# Patient Record
Sex: FEMALE | Race: WHITE | NOT HISPANIC OR LATINO | Employment: OTHER | URBAN - NONMETROPOLITAN AREA
[De-identification: names, ages, dates, MRNs, and addresses within clinical notes are randomized per-mention and may not be internally consistent; named-entity substitution may affect disease eponyms.]

---

## 2017-01-06 ENCOUNTER — OFFICE VISIT (OUTPATIENT)
Dept: FAMILY MEDICINE | Facility: OTHER | Age: 62
End: 2017-01-06
Attending: PHYSICIAN ASSISTANT
Payer: COMMERCIAL

## 2017-01-06 VITALS
HEIGHT: 65 IN | HEART RATE: 70 BPM | DIASTOLIC BLOOD PRESSURE: 76 MMHG | OXYGEN SATURATION: 97 % | BODY MASS INDEX: 30.99 KG/M2 | TEMPERATURE: 97.9 F | RESPIRATION RATE: 16 BRPM | WEIGHT: 186 LBS | SYSTOLIC BLOOD PRESSURE: 122 MMHG

## 2017-01-06 DIAGNOSIS — J43.8 OTHER EMPHYSEMA (H): Primary | ICD-10-CM

## 2017-01-06 PROCEDURE — 99213 OFFICE O/P EST LOW 20 MIN: CPT | Performed by: PHYSICIAN ASSISTANT

## 2017-01-06 ASSESSMENT — ANXIETY QUESTIONNAIRES
2. NOT BEING ABLE TO STOP OR CONTROL WORRYING: SEVERAL DAYS
7. FEELING AFRAID AS IF SOMETHING AWFUL MIGHT HAPPEN: SEVERAL DAYS
6. BECOMING EASILY ANNOYED OR IRRITABLE: NOT AT ALL
IF YOU CHECKED OFF ANY PROBLEMS ON THIS QUESTIONNAIRE, HOW DIFFICULT HAVE THESE PROBLEMS MADE IT FOR YOU TO DO YOUR WORK, TAKE CARE OF THINGS AT HOME, OR GET ALONG WITH OTHER PEOPLE: NOT DIFFICULT AT ALL
GAD7 TOTAL SCORE: 5
3. WORRYING TOO MUCH ABOUT DIFFERENT THINGS: SEVERAL DAYS
1. FEELING NERVOUS, ANXIOUS, OR ON EDGE: SEVERAL DAYS
5. BEING SO RESTLESS THAT IT IS HARD TO SIT STILL: NOT AT ALL

## 2017-01-06 ASSESSMENT — PAIN SCALES - GENERAL: PAINLEVEL: NO PAIN (0)

## 2017-01-06 ASSESSMENT — PATIENT HEALTH QUESTIONNAIRE - PHQ9: 5. POOR APPETITE OR OVEREATING: SEVERAL DAYS

## 2017-01-06 NOTE — PROGRESS NOTES
SUBJECTIVE:                                                    Latrice Henao is a 61 year old female who presents to clinic today for the following health issues:      COPD Follow-Up    Symptoms are currently: slightly worsened as it normally is in winter    Current fatigue or dyspnea with ambulation: none, does with stairs    Shortness of breath: slightly worsened due to cold air    Increased or change in Cough/Sputum: No    Fever(s): No    Baseline ambulation without stopping to rest only has issue if walks fast. . Able to walk up 12 stairs without stopping to rest.    Any ER/UC or hospital admissions since your last visit? No     History   Smoking status     Former Smoker -- 0.50 packs/day for 40 years     Types: Cigarettes     Quit date: 12/28/2012   Smokeless tobacco     Not on file     Comment: NextGen: current every day smoker - passive smoke exposure     No results found for this basename: FEV1, VTC6KSM       Amount of exercise or physical activity: 6-7 days/week for an average of 30-45 minutes    Problems taking medications regularly: No    Medication side effects: none    Diet: regular (no restrictions)        Problem list and histories reviewed & adjusted, as indicated.  Additional history: as documented    Patient Active Problem List   Diagnosis     COPD (chronic obstructive pulmonary disease) (H)     Hypertension     ACP (advance care planning)     Past Surgical History   Procedure Laterality Date     Blepharoplasty, brow lift bilateral, combined Bilateral 6/2/2016     Procedure: COMBINED BLEPHAROPLASTY, BROW LIFT BILATERAL;  Surgeon: David Vitale MD;  Location: [facility]       Social History   Substance Use Topics     Smoking status: Former Smoker -- 0.50 packs/day for 40 years     Types: Cigarettes     Quit date: 12/28/2012     Smokeless tobacco: Not on file      Comment: NextGen: current every day smoker - passive smoke exposure     Alcohol Use: No     Family History   Problem Relation  "Age of Onset     CANCER Father      lymphoma     Myocardial Infarction Paternal Grandmother      myocardial infarction - cause of death         Current Outpatient Prescriptions   Medication Sig Dispense Refill     albuterol (ALBUTEROL) 108 (90 BASE) MCG/ACT inhaler Inhale 2 puffs into the lungs every 6 hours as needed 3 Inhaler 4     budesonide-formoterol (SYMBICORT) 160-4.5 MCG/ACT inhaler Inhale 2 puffs into the lungs 2 times daily 3 Inhaler 3     escitalopram (LEXAPRO) 20 MG tablet Take 1 tablet (20 mg) by mouth daily 90 tablet 3     loratadine 10 MG capsule Take 10 mg by mouth daily 30 capsule 11     LORazepam (ATIVAN) 0.5 MG tablet Take 1 tablet (0.5 mg) by mouth every 8 hours as needed for anxiety 10 tablet 0     No Known Allergies  BP Readings from Last 3 Encounters:   01/06/17 122/76   06/24/16 132/78   06/02/16 158/86    Wt Readings from Last 3 Encounters:   01/06/17 186 lb (84.369 kg)   06/24/16 178 lb (80.74 kg)   05/20/16 174 lb (78.926 kg)                  Problem list, Medication list, Allergies, and Medical/Social/Surgical histories reviewed in [facility] and updated as appropriate.    ROS:  Constitutional, neuro, ENT, endocrine, pulmonary, cardiac, gastrointestinal, genitourinary, musculoskeletal, integument and psychiatric systems are negative, except as otherwise noted.    OBJECTIVE:                                                    /76 mmHg  Pulse 70  Temp(Src) 97.9  F (36.6  C) (Tympanic)  Resp 16  Ht 5' 4.5\" (1.638 m)  Wt 186 lb (84.369 kg)  BMI 31.45 kg/m2  SpO2 97%  Body mass index is 31.45 kg/(m^2).  GENERAL APPEARANCE: healthy, alert and no distress  EYES: Eyes grossly normal to inspection, PERRL and conjunctivae and sclerae normal  HENT: ear canals and TM's normal and nose and mouth without ulcers or lesions  NECK: no adenopathy, no asymmetry, masses, or scars and thyroid normal to palpation  RESP: lungs clear to auscultation - no rales, rhonchi or wheezes  CV: regular rates and " rhythm, normal S1 S2, no S3 or S4 and no murmur, click or rub  LYMPHATICS: normal ant/post cervical and supraclavicular nodes  ABDOMEN: soft, nontender, without hepatosplenomegaly or masses and bowel sounds normal  MS: extremities normal- no gross deformities noted  SKIN: no suspicious lesions or rashes  NEURO: Normal strength and tone, mentation intact and speech normal  PSYCH: mentation appears normal and affect stressed. Mom is ill and going to ER as she is in her visit today.     Diagnostic test results:  Diagnostic Test Results:  none      ASSESSMENT/PLAN:                                                    1. Other emphysema (H)  She is in need of PFT never did have this.   Cold is a trigger. Using her inhaler. Not finding much relief today.    See us back once done.     - COPD ACTION PLAN - order for Health Maintenance      See Patient Instructions    JUSTIN Lira  [facility] JULI

## 2017-01-06 NOTE — MR AVS SNAPSHOT
After Visit Summary   1/6/2017    Latrice Henao    MRN: [id number]           Patient Information     Date Of Birth          1955        Visit Information        Provider Department      1/6/2017 8:45 AM Elsa Medrano, PA [facility]        Today's Diagnoses     Other emphysema (H)    -  1       Care Instructions      My COPD Action Plan   Name: Latrice Henao    YOB: 1955    Date: 1/6/2017    My doctor: Elsa Medrano    My clinic: [facility]    [address]  [place] [address]  [phone]   My COPD Medicine:       My Controller Medications: Formoterol/Budesonide (Symbicort)     My Rescue Medication:  Albuterol     Use of Oxygen:  Oxygen Not Prescribed   My COPD Severity: Spirometry never done.        GREEN ZONE       Doing well today      Usual level of activity and exercise    Usual amount of cough and mucus    No shortness of breath    Can think clearly    Sleep well at night    Feel like eating Actions:      Take daily medicines    Use oxygen as prescribed    Follow regular exercise and diet plan    Avoid cigarette smoke and other irritants that harm the lungs             YELLOW ZONE          Having a bad day or flare up      Short of breath more than usual    Change in color or amount of mucus    More coughing or wheezing    Fever or chills    Less energy; trouble completing activities    Trouble thinking or focusing    Using quick relief inhaler or nebulizer more often    Poor sleep; symptoms wake me up    Do not feel like eating Actions:      Take daily medicines    Use quick relief inhaler every 4 hours    If you use oxygen, call you doctor to see if you should adjust your oxygen    Do breathing exercises or other things to help you relax    Let a loved one, friend or neighbor know you are feeling worse    If you have one or more symptoms, consider taking steroids or antibiotics (if they were prescribed)    Call your care  team if you have 2 or more symptoms or symptoms don't improve           RED ZONE       Need medical care now      Severe shortness of breath (feel you can't breath)    Not enough breath to do any activity    Trouble walking or talking    Trouble coughing up mucus or blood in mucus    Frequent coughing    Rescue medicines are not working    Not able to sleep because of breathing    Feel confused or drowsy    Chest pain  Actions:      Call 911 or     Have someone take you to the emergency room if you can't reach your care team        Electronically signed by: Deann Soto, January 6, 2017     Annual Reminders:  Meet with Care Team, Flu Shot every Fall and Pneumonia Shot at least once.    [facility] [place], [place] - [address] [facility]         Follow-ups after your visit        Who to contact     If you have questions or need follow up information about today's clinic visit or your schedule please contact [facility] directly at [phone].  Normal or non-critical lab and imaging results will be communicated to you by Acid Labshart, letter or phone within 4 business days after the clinic has received the results. If you do not hear from us within 7 days, please contact the clinic through OxiCoolt or phone. If you have a critical or abnormal lab result, we will notify you by phone as soon as possible.  Submit refill requests through Ayannah or call your pharmacy and they will forward the refill request to us. Please allow 3 business days for your refill to be completed.          Additional Information About Your Visit        Acid Labshart Information     Ayannah gives you secure access to your electronic health record. If you see a primary care provider, you can also send messages to your care team and make appointments. If you have questions, please call your primary care clinic.  If you do not have a primary care provider, please call [phone] and they will assist you.        Care EveryWhere ID     This is  "your Care EveryWhere ID. This could be used by other organizations to access your [facility] medical records  [id number]        Your Vitals Were     Pulse Temperature Respirations Height BMI (Body Mass Index) Pulse Oximetry    70 97.9  F (36.6  C) (Tympanic) 16 5' 4.5\" (1.638 m) 31.45 kg/m2 97%       Blood Pressure from Last 3 Encounters:   01/06/17 122/76   06/24/16 132/78   06/02/16 158/86    Weight from Last 3 Encounters:   01/06/17 186 lb (84.369 kg)   06/24/16 178 lb (80.74 kg)   05/20/16 174 lb (78.926 kg)              We Performed the Following     COPD ACTION PLAN - order for Health Maintenance        Primary Care Provider Office Phone # Fax #    JUSTIN Bansal [phone]       [facility] [address]  [facility] [place] [address]        Thank you!     Thank you for choosing [facility]  for your care. Our goal is always to provide you with excellent care. Hearing back from our patients is one way we can continue to improve our services. Please take a few minutes to complete the written survey that you may receive in the mail after your visit with us. Thank you!             Your Updated Medication List - Protect others around you: Learn how to safely use, store and throw away your medicines at www.disposemymeds.org.          This list is accurate as of: 1/6/17  9:37 AM.  Always use your most recent med list.                   Brand Name Dispense Instructions for use    albuterol 108 (90 BASE) MCG/ACT Inhaler    albuterol    3 Inhaler    Inhale 2 puffs into the lungs every 6 hours as needed       budesonide-formoterol 160-4.5 MCG/ACT Inhaler    SYMBICORT    3 Inhaler    Inhale 2 puffs into the lungs 2 times daily       escitalopram 20 MG tablet    LEXAPRO    90 tablet    Take 1 tablet (20 mg) by mouth daily       loratadine 10 MG capsule     30 capsule    Take 10 mg by mouth daily       LORazepam 0.5 MG tablet    ATIVAN    10 tablet    Take 1 tablet (0.5 mg) by " mouth every 8 hours as needed for anxiety

## 2017-01-06 NOTE — NURSING NOTE
"Chief Complaint   Patient presents with     COPD       Initial /76 mmHg  Pulse 70  Temp(Src) 97.9  F (36.6  C) (Tympanic)  Resp 16  Ht 5' 4.5\" (1.638 m)  Wt 186 lb (84.369 kg)  BMI 31.45 kg/m2  SpO2 97% Estimated body mass index is 31.45 kg/(m^2) as calculated from the following:    Height as of this encounter: 5' 4.5\" (1.638 m).    Weight as of this encounter: 186 lb (84.369 kg).  BP completed using cuff size: swapna Soto LPN      "

## 2017-01-06 NOTE — PATIENT INSTRUCTIONS
My COPD Action Plan   Name: Latrice Henao    YOB: 1955    Date: 1/6/2017    My doctor: Elsa Medrano    My clinic: [facility] [address]  [place] [address]  [phone]   My COPD Medicine:       My Controller Medications: Formoterol/Budesonide (Symbicort)     My Rescue Medication:  Albuterol     Use of Oxygen:  Oxygen Not Prescribed   My COPD Severity: Spirometry never done.        GREEN ZONE       Doing well today      Usual level of activity and exercise    Usual amount of cough and mucus    No shortness of breath    Can think clearly    Sleep well at night    Feel like eating Actions:      Take daily medicines    Use oxygen as prescribed    Follow regular exercise and diet plan    Avoid cigarette smoke and other irritants that harm the lungs             YELLOW ZONE          Having a bad day or flare up      Short of breath more than usual    Change in color or amount of mucus    More coughing or wheezing    Fever or chills    Less energy; trouble completing activities    Trouble thinking or focusing    Using quick relief inhaler or nebulizer more often    Poor sleep; symptoms wake me up    Do not feel like eating Actions:      Take daily medicines    Use quick relief inhaler every 4 hours    If you use oxygen, call you doctor to see if you should adjust your oxygen    Do breathing exercises or other things to help you relax    Let a loved one, friend or neighbor know you are feeling worse    If you have one or more symptoms, consider taking steroids or antibiotics (if they were prescribed)    Call your care team if you have 2 or more symptoms or symptoms don't improve           RED ZONE       Need medical care now      Severe shortness of breath (feel you can't breath)    Not enough breath to do any activity    Trouble walking or talking    Trouble coughing up mucus or blood in mucus    Frequent coughing    Rescue medicines are not working    Not able to sleep  because of breathing    Feel confused or drowsy    Chest pain  Actions:      Call 911 or     Have someone take you to the emergency room if you can't reach your care team        Electronically signed by: Deann Soto, January 6, 2017     Annual Reminders:  Meet with Care Team, Flu Shot every Fall and Pneumonia Shot at least once.    [facility] - [place], [place] - [address] [facility]

## 2017-01-07 ASSESSMENT — ANXIETY QUESTIONNAIRES: GAD7 TOTAL SCORE: 5

## 2017-01-07 ASSESSMENT — PATIENT HEALTH QUESTIONNAIRE - PHQ9: SUM OF ALL RESPONSES TO PHQ QUESTIONS 1-9: 3

## 2017-01-30 ENCOUNTER — MYC MEDICAL ADVICE (OUTPATIENT)
Dept: FAMILY MEDICINE | Facility: OTHER | Age: 62
End: 2017-01-30

## 2017-01-30 DIAGNOSIS — J43.8 OTHER EMPHYSEMA (H): Primary | ICD-10-CM

## 2017-01-30 NOTE — TELEPHONE ENCOUNTER
Patient would like to see Dr. Grant after she has her PFT's done. [facility] so I did fix this.  Please advise if okay for referral.  Deann Soto LPN

## 2017-02-07 ENCOUNTER — HOSPITAL ENCOUNTER (OUTPATIENT)
Dept: RESPIRATORY THERAPY | Facility: HOSPITAL | Age: 62
Discharge: HOME | End: 2017-02-07
Attending: PHYSICIAN ASSISTANT | Admitting: PHYSICIAN ASSISTANT
Payer: COMMERCIAL

## 2017-02-07 DIAGNOSIS — J43.8 OTHER EMPHYSEMA (H): ICD-10-CM

## 2017-02-07 LAB
COHGB MFR BLD: 1.7 % (ref 0–2)
HGB BLD-MCNC: 13.4 G/DL (ref 11.7–15.7)

## 2017-02-07 PROCEDURE — 94726 PLETHYSMOGRAPHY LUNG VOLUMES: CPT | Mod: 26 | Performed by: INTERNAL MEDICINE

## 2017-02-07 PROCEDURE — 94726 PLETHYSMOGRAPHY LUNG VOLUMES: CPT

## 2017-02-07 PROCEDURE — 94729 DIFFUSING CAPACITY: CPT

## 2017-02-07 PROCEDURE — 25000308 HC RX OP HPI UCR WEL MED 250 IP 250: Performed by: PHYSICIAN ASSISTANT

## 2017-02-07 PROCEDURE — 82375 ASSAY CARBOXYHB QUANT: CPT | Performed by: PHYSICIAN ASSISTANT

## 2017-02-07 PROCEDURE — 85018 HEMOGLOBIN: CPT | Performed by: PHYSICIAN ASSISTANT

## 2017-02-07 PROCEDURE — 36415 COLL VENOUS BLD VENIPUNCTURE: CPT | Performed by: PHYSICIAN ASSISTANT

## 2017-02-07 PROCEDURE — 94729 DIFFUSING CAPACITY: CPT | Mod: 26 | Performed by: INTERNAL MEDICINE

## 2017-02-07 PROCEDURE — 94060 EVALUATION OF WHEEZING: CPT | Mod: 26 | Performed by: INTERNAL MEDICINE

## 2017-02-07 PROCEDURE — 94060 EVALUATION OF WHEEZING: CPT

## 2017-02-07 RX ORDER — ALBUTEROL SULFATE 0.83 MG/ML
2.5 SOLUTION RESPIRATORY (INHALATION) ONCE
Status: COMPLETED | OUTPATIENT
Start: 2017-02-07 | End: 2017-02-07

## 2017-02-07 RX ADMIN — ALBUTEROL SULFATE 2.5 MG: 2.5 SOLUTION RESPIRATORY (INHALATION) at 08:03

## 2017-03-20 ENCOUNTER — OFFICE VISIT (OUTPATIENT)
Dept: FAMILY MEDICINE | Facility: OTHER | Age: 62
End: 2017-03-20
Attending: PHYSICIAN ASSISTANT
Payer: COMMERCIAL

## 2017-03-20 VITALS
HEART RATE: 72 BPM | WEIGHT: 184 LBS | BODY MASS INDEX: 30.66 KG/M2 | TEMPERATURE: 98.3 F | SYSTOLIC BLOOD PRESSURE: 142 MMHG | DIASTOLIC BLOOD PRESSURE: 82 MMHG | HEIGHT: 65 IN | OXYGEN SATURATION: 93 %

## 2017-03-20 DIAGNOSIS — I10 ESSENTIAL HYPERTENSION: ICD-10-CM

## 2017-03-20 DIAGNOSIS — E78.00 ELEVATED CHOLESTEROL: ICD-10-CM

## 2017-03-20 DIAGNOSIS — J43.1 PANLOBULAR EMPHYSEMA (H): Primary | ICD-10-CM

## 2017-03-20 DIAGNOSIS — I51.89 DIASTOLIC DYSFUNCTION: ICD-10-CM

## 2017-03-20 DIAGNOSIS — Z23 IMMUNIZATION DUE: ICD-10-CM

## 2017-03-20 LAB
ALBUMIN SERPL-MCNC: 3.9 G/DL (ref 3.4–5)
ALP SERPL-CCNC: 57 U/L (ref 40–150)
ALT SERPL W P-5'-P-CCNC: 27 U/L (ref 0–50)
ANION GAP SERPL CALCULATED.3IONS-SCNC: 6 MMOL/L (ref 3–14)
AST SERPL W P-5'-P-CCNC: 14 U/L (ref 0–45)
BASOPHILS # BLD AUTO: 0.1 10E9/L (ref 0–0.2)
BASOPHILS NFR BLD AUTO: 1.8 %
BILIRUB SERPL-MCNC: 0.5 MG/DL (ref 0.2–1.3)
BUN SERPL-MCNC: 13 MG/DL (ref 7–30)
CALCIUM SERPL-MCNC: 8.5 MG/DL (ref 8.5–10.1)
CHLORIDE SERPL-SCNC: 107 MMOL/L (ref 94–109)
CHOLEST SERPL-MCNC: 166 MG/DL
CO2 SERPL-SCNC: 28 MMOL/L (ref 20–32)
CREAT SERPL-MCNC: 0.71 MG/DL (ref 0.52–1.04)
DIFFERENTIAL METHOD BLD: NORMAL
EOSINOPHIL # BLD AUTO: 0.2 10E9/L (ref 0–0.7)
EOSINOPHIL NFR BLD AUTO: 4 %
ERYTHROCYTE [DISTWIDTH] IN BLOOD BY AUTOMATED COUNT: 12.8 % (ref 10–15)
GFR SERPL CREATININE-BSD FRML MDRD: 83 ML/MIN/1.7M2
GLUCOSE SERPL-MCNC: 93 MG/DL (ref 70–99)
HCT VFR BLD AUTO: 40.6 % (ref 35–47)
HDLC SERPL-MCNC: 76 MG/DL
HGB BLD-MCNC: 13.2 G/DL (ref 11.7–15.7)
IMM GRANULOCYTES # BLD: 0 10E9/L (ref 0–0.4)
IMM GRANULOCYTES NFR BLD: 0.2 %
LDLC SERPL CALC-MCNC: 79 MG/DL
LYMPHOCYTES # BLD AUTO: 1.5 10E9/L (ref 0.8–5.3)
LYMPHOCYTES NFR BLD AUTO: 33.1 %
MCH RBC QN AUTO: 28.9 PG (ref 26.5–33)
MCHC RBC AUTO-ENTMCNC: 32.5 G/DL (ref 31.5–36.5)
MCV RBC AUTO: 89 FL (ref 78–100)
MONOCYTES # BLD AUTO: 0.3 10E9/L (ref 0–1.3)
MONOCYTES NFR BLD AUTO: 6.7 %
NEUTROPHILS # BLD AUTO: 2.4 10E9/L (ref 1.6–8.3)
NEUTROPHILS NFR BLD AUTO: 54.2 %
NONHDLC SERPL-MCNC: 90 MG/DL
NRBC # BLD AUTO: 0 10*3/UL
NRBC BLD AUTO-RTO: 0 /100
PLATELET # BLD AUTO: 260 10E9/L (ref 150–450)
POTASSIUM SERPL-SCNC: 4.2 MMOL/L (ref 3.4–5.3)
PROT SERPL-MCNC: 7.5 G/DL (ref 6.8–8.8)
RBC # BLD AUTO: 4.57 10E12/L (ref 3.8–5.2)
SODIUM SERPL-SCNC: 141 MMOL/L (ref 133–144)
TRIGL SERPL-MCNC: 55 MG/DL
TSH SERPL DL<=0.005 MIU/L-ACNC: 0.58 MU/L (ref 0.4–4)
WBC # BLD AUTO: 4.5 10E9/L (ref 4–11)

## 2017-03-20 PROCEDURE — 80050 GENERAL HEALTH PANEL: CPT | Performed by: PHYSICIAN ASSISTANT

## 2017-03-20 PROCEDURE — 90471 IMMUNIZATION ADMIN: CPT | Performed by: PHYSICIAN ASSISTANT

## 2017-03-20 PROCEDURE — 80061 LIPID PANEL: CPT | Performed by: PHYSICIAN ASSISTANT

## 2017-03-20 PROCEDURE — 36415 COLL VENOUS BLD VENIPUNCTURE: CPT | Performed by: PHYSICIAN ASSISTANT

## 2017-03-20 PROCEDURE — 99214 OFFICE O/P EST MOD 30 MIN: CPT | Mod: 25 | Performed by: PHYSICIAN ASSISTANT

## 2017-03-20 PROCEDURE — 90732 PPSV23 VACC 2 YRS+ SUBQ/IM: CPT | Performed by: PHYSICIAN ASSISTANT

## 2017-03-20 RX ORDER — ALBUTEROL SULFATE 90 UG/1
2 AEROSOL, METERED RESPIRATORY (INHALATION) EVERY 6 HOURS PRN
Qty: 3 INHALER | Refills: 4 | Status: SHIPPED | OUTPATIENT
Start: 2017-03-20 | End: 2017-06-14

## 2017-03-20 RX ORDER — TIOTROPIUM BROMIDE 18 UG/1
CAPSULE ORAL; RESPIRATORY (INHALATION)
Qty: 30 CAPSULE | Refills: 1 | Status: SHIPPED | OUTPATIENT
Start: 2017-03-20 | End: 2017-05-18

## 2017-03-20 ASSESSMENT — PAIN SCALES - GENERAL: PAINLEVEL: NO PAIN (0)

## 2017-03-20 NOTE — PATIENT INSTRUCTIONS
Thank you for choosing [facility].   I appreciate the opportunity to serve you and look forward to supporting your healthcare needs in the future. Please contact me with any questions or concerns that you may have.    Sincerely,    Elsa Medrano RN, PA-C

## 2017-03-20 NOTE — MR AVS SNAPSHOT
After Visit Summary   3/20/2017    Latrice Henao    MRN: [id number]           Patient Information     Date Of Birth          1955        Visit Information        Provider Department      3/20/2017 10:30 AM Elsa Medrano PA [facility]        Today's Diagnoses     Panlobular emphysema (H)    -  1    Essential hypertension        Elevated cholesterol          Care Instructions    Thank you for choosing [facility].   I appreciate the opportunity to serve you and look forward to supporting your healthcare needs in the future. Please contact me with any questions or concerns that you may have.    Sincerely,    Elsa Medrano RN, PA-C           Follow-ups after your visit        Who to contact     If you have questions or need follow up information about today's clinic visit or your schedule please contact [facility] directly at [phone].  Normal or non-critical lab and imaging results will be communicated to you by MyChart, letter or phone within 4 business days after the clinic has received the results. If you do not hear from us within 7 days, please contact the clinic through MyChart or phone. If you have a critical or abnormal lab result, we will notify you by phone as soon as possible.  Submit refill requests through Buzz All Stars or call your pharmacy and they will forward the refill request to us. Please allow 3 business days for your refill to be completed.          Additional Information About Your Visit        MyChart Information     Buzz All Stars gives you secure access to your electronic health record. If you see a primary care provider, you can also send messages to your care team and make appointments. If you have questions, please call your primary care clinic.  If you do not have a primary care provider, please call [phone] and they will assist you.        Care EveryWhere ID     This is your Care EveryWhere ID. This could be used by other  "organizations to access your [facility] medical records  [id number]        Your Vitals Were     Pulse Temperature Height Pulse Oximetry BMI (Body Mass Index)       72 98.3  F (36.8  C) (Tympanic) 5' 4.5\" (1.638 m) 93% 31.1 kg/m2        Blood Pressure from Last 3 Encounters:   03/20/17 142/82   01/06/17 122/76   06/24/16 132/78    Weight from Last 3 Encounters:   03/20/17 184 lb (83.5 kg)   01/06/17 186 lb (84.4 kg)   06/24/16 178 lb (80.7 kg)              We Performed the Following     CBC with platelets differential     Comprehensive metabolic panel     CT Chest w Contrast     Echocardiogram     Lipid Profile     TSH with free T4 reflex          Today's Medication Changes          These changes are accurate as of: 3/20/17 12:04 PM.  If you have any questions, ask your nurse or doctor.               Start taking these medicines.        Dose/Directions    fluticasone-vilanterol 200-25 MCG/INH oral inhaler   Commonly known as:  BREO ELLIPTA   Used for:  Panlobular emphysema (H)   Started by:  Elsa Medrano PA        Dose:  1 puff   Inhale 1 puff into the lungs daily   Quantity:  1 Inhaler   Refills:  1       tiotropium 18 MCG capsule   Commonly known as:  SPIRIVA HANDIHALER   Used for:  Panlobular emphysema (H)   Started by:  Elsa Mderano PA        Inhale contents of one capsule daily.   Quantity:  30 capsule   Refills:  1         Stop taking these medicines if you haven't already. Please contact your care team if you have questions.     budesonide-formoterol 160-4.5 MCG/ACT Inhaler   Commonly known as:  SYMBICORT   Stopped by:  Elsa Medrano PA                Where to get your medicines      These medications were sent to [facility] - [place], [place] - [address] [address]     Phone:  [phone]     albuterol 108 (90 BASE) MCG/ACT Inhaler    fluticasone-vilanterol 200-25 MCG/INH oral inhaler    tiotropium 18 MCG capsule                Primary Care Provider Office " Phone # Fax #    JUSTIN Bansal [phone]       [facility] [address] [place] [address]        Thank you!     Thank you for choosing [facility]  for your care. Our goal is always to provide you with excellent care. Hearing back from our patients is one way we can continue to improve our services. Please take a few minutes to complete the written survey that you may receive in the mail after your visit with us. Thank you!             Your Updated Medication List - Protect others around you: Learn how to safely use, store and throw away your medicines at www.disposemymeds.org.          This list is accurate as of: 3/20/17 12:04 PM.  Always use your most recent med list.                   Brand Name Dispense Instructions for use    albuterol 108 (90 BASE) MCG/ACT Inhaler    albuterol    3 Inhaler    Inhale 2 puffs into the lungs every 6 hours as needed       escitalopram 20 MG tablet    LEXAPRO    90 tablet    Take 1 tablet (20 mg) by mouth daily       fluticasone-vilanterol 200-25 MCG/INH oral inhaler    BREO ELLIPTA    1 Inhaler    Inhale 1 puff into the lungs daily       loratadine 10 MG capsule     30 capsule    Take 10 mg by mouth daily       LORazepam 0.5 MG tablet    ATIVAN    10 tablet    Take 1 tablet (0.5 mg) by mouth every 8 hours as needed for anxiety       tiotropium 18 MCG capsule    SPIRIVA HANDIHALER    30 capsule    Inhale contents of one capsule daily.

## 2017-03-20 NOTE — PROGRESS NOTES
SUBJECTIVE:                                                    Latrice Henao is a 62 year old female who presents to clinic today for the following health issues:      Breathing Problem      Duration: Follow up     Description (location/character/radiation): PFT Done, SOB    Intensity:  moderate, severe    Accompanying signs and symptoms: none    History (similar episodes/previous evaluation): yes    Precipitating or alleviating factors: None    Therapies tried and outcome: PFT Done. Inhalers not working        Hyperlipidemia Follow-Up      Rate your low fat/cholesterol diet?: good    Taking statin?  No    Other lipid medications/supplements?:  none     Hypertension Follow-up      Outpatient blood pressures are not being checked.    Low Salt Diet: no added salt       Problem list and histories reviewed & adjusted, as indicated.  Additional history: as documented    Patient Active Problem List   Diagnosis     COPD (chronic obstructive pulmonary disease) (H)     Hypertension     ACP (advance care planning)     Past Surgical History   Procedure Laterality Date     Blepharoplasty, brow lift bilateral, combined Bilateral 6/2/2016     Procedure: COMBINED BLEPHAROPLASTY, BROW LIFT BILATERAL;  Surgeon: David Vitale MD;  Location: [facility]       Social History   Substance Use Topics     Smoking status: Former Smoker     Packs/day: 0.50     Years: 40.00     Types: Cigarettes     Quit date: 12/28/2012     Smokeless tobacco: Not on file      Comment: [employer]: current every day smoker - passive smoke exposure     Alcohol use No     Family History   Problem Relation Age of Onset     Myocardial Infarction Paternal Grandmother      myocardial infarction - cause of death     CANCER Father      lymphoma         Current Outpatient Prescriptions   Medication Sig Dispense Refill     fluticasone-vilanterol (BREO ELLIPTA) 200-25 MCG/INH oral inhaler Inhale 1 puff into the lungs daily 1 Inhaler 1     tiotropium (SPIRIVA  "HANDIHALER) 18 MCG capsule Inhale contents of one capsule daily. 30 capsule 1     albuterol (ALBUTEROL) 108 (90 BASE) MCG/ACT inhaler Inhale 2 puffs into the lungs every 6 hours as needed 3 Inhaler 4     escitalopram (LEXAPRO) 20 MG tablet Take 1 tablet (20 mg) by mouth daily 90 tablet 3     loratadine 10 MG capsule Take 10 mg by mouth daily 30 capsule 11     LORazepam (ATIVAN) 0.5 MG tablet Take 1 tablet (0.5 mg) by mouth every 8 hours as needed for anxiety 10 tablet 0     No Known Allergies  BP Readings from Last 3 Encounters:   03/20/17 142/82   01/06/17 122/76   06/24/16 132/78    Wt Readings from Last 3 Encounters:   03/20/17 184 lb (83.5 kg)   01/06/17 186 lb (84.4 kg)   06/24/16 178 lb (80.7 kg)                    Reviewed and updated as needed this visit by clinical staff       Reviewed and updated as needed this visit by Provider         ROS:  Constitutional, neuro, ENT, endocrine, pulmonary, cardiac, gastrointestinal, genitourinary, musculoskeletal, integument and psychiatric systems are negative, except as otherwise noted.    OBJECTIVE:                                                    /82 (BP Location: Left arm, Patient Position: Chair, Cuff Size: Adult Regular)  Pulse 72  Temp 98.3  F (36.8  C) (Tympanic)  Ht 5' 4.5\" (1.638 m)  Wt 184 lb (83.5 kg)  SpO2 93%  BMI 31.1 kg/m2  Body mass index is 31.1 kg/(m^2).  GENERAL APPEARANCE: healthy, alert and no distress  EYES: Eyes grossly normal to inspection, PERRL and conjunctivae and sclerae normal  HENT: ear canals and TM's normal and nose and mouth without ulcers or lesions  NECK: no adenopathy, no asymmetry, masses, or scars and thyroid normal to palpation  RESP: lungs clear to auscultation - no rales, rhonchi or wheezes  CV: regular rates and rhythm, normal S1 S2, no S3 or S4 and no murmur, click or rub  LYMPHATICS: normal ant/post cervical and supraclavicular nodes  ABDOMEN: soft, nontender, without hepatosplenomegaly or masses and bowel sounds " normal  MS: extremities normal- no gross deformities noted  SKIN: no suspicious lesions or rashes  NEURO: Normal strength and tone, mentation intact and speech normal  PSYCH: mentation appears normal and affect normal/bright    Diagnostic test results:  Diagnostic Test Results:  Results for orders placed or performed in visit on 03/20/17   CT Chest w Contrast    Narrative    CT SCAN OF CHEST WITH IV CONTRAST    REPORT:  There is interstitial thickening seen in both lung bases,  worse on the right than the left.  The degree of interstitial  thickening is mild. The remainder of the lungs are clear.  No hilar or  mediastinal masses or lymphadenopathy is noted.  No pleural  abnormalities are seen.  Axillary and supraclavicular lymph nodes  appear normal.    IMPRESSION:  MILD INTERSTITIAL THICKENING, WORSE ON THE RIGHT THAN THE  LEFT, AT BOTH LUNG BASES.  Exam Date: Mar 22, 2017 07:40:00 AM  Author: LARRY MACDONALD  This report is final and signed     Comprehensive metabolic panel   Result Value Ref Range    Sodium 141 133 - 144 mmol/L    Potassium 4.2 3.4 - 5.3 mmol/L    Chloride 107 94 - 109 mmol/L    Carbon Dioxide 28 20 - 32 mmol/L    Anion Gap 6 3 - 14 mmol/L    Glucose 93 70 - 99 mg/dL    Urea Nitrogen 13 7 - 30 mg/dL    Creatinine 0.71 0.52 - 1.04 mg/dL    GFR Estimate 83 >60 mL/min/1.7m2    GFR Estimate If Black >90   GFR Calc   >60 mL/min/1.7m2    Calcium 8.5 8.5 - 10.1 mg/dL    Bilirubin Total 0.5 0.2 - 1.3 mg/dL    Albumin 3.9 3.4 - 5.0 g/dL    Protein Total 7.5 6.8 - 8.8 g/dL    Alkaline Phosphatase 57 40 - 150 U/L    ALT 27 0 - 50 U/L    AST 14 0 - 45 U/L   CBC with platelets differential   Result Value Ref Range    WBC 4.5 4.0 - 11.0 10e9/L    RBC Count 4.57 3.8 - 5.2 10e12/L    Hemoglobin 13.2 11.7 - 15.7 g/dL    Hematocrit 40.6 35.0 - 47.0 %    MCV 89 78 - 100 fl    MCH 28.9 26.5 - 33.0 pg    MCHC 32.5 31.5 - 36.5 g/dL    RDW 12.8 10.0 - 15.0 %    Platelet Count 260 150 - 450 10e9/L     Diff Method Automated Method     % Neutrophils 54.2 %    % Lymphocytes 33.1 %    % Monocytes 6.7 %    % Eosinophils 4.0 %    % Basophils 1.8 %    % Immature Granulocytes 0.2 %    Nucleated RBCs 0 0 /100    Absolute Neutrophil 2.4 1.6 - 8.3 10e9/L    Absolute Lymphocytes 1.5 0.8 - 5.3 10e9/L    Absolute Monocytes 0.3 0.0 - 1.3 10e9/L    Absolute Eosinophils 0.2 0.0 - 0.7 10e9/L    Absolute Basophils 0.1 0.0 - 0.2 10e9/L    Abs Immature Granulocytes 0.0 0 - 0.4 10e9/L    Absolute Nucleated RBC 0.0    TSH with free T4 reflex   Result Value Ref Range    TSH 0.58 0.40 - 4.00 mU/L   Lipid Profile   Result Value Ref Range    Cholesterol 166 <200 mg/dL    Triglycerides 55 <150 mg/dL    HDL Cholesterol 76 >49 mg/dL    LDL Cholesterol Calculated 79 <100 mg/dL    Non HDL Cholesterol 90 <130 mg/dL   Echocardiogram    Narrative    ECHOCARDIOGRAM    M-mode, two-dimensional, color-flow, and Doppler studies were obtained  on this patient.  Standard views were utilized.    ORDERING PHYSICIAN:  FRANCISCA Mayberry    INDICATIONS:  Emphysema    Cardiac Dimensions                                    Normal  Dimensions  Aortic Root:                             31.7 mm      Aortic Root  Diameter: 20-37 mm  Left Atrium:                             27.3 mm      Left Atrium:  19-40 mm  Right Ventricle:                not measured      Right Ventricle:  7-23 mm  Left Ventricle end-diastole:    58.6 mm      Left Ventricle  end-diastole: 35-56 mm  Left Ventricle end-systole:     42.3 mm      Left Ventricle  end-systole: 35-56 mm  IVS end-diastole:                      9.3 mm      IVS end-diastole:  7-11 mm  LVPW:                                      8.4 mm      LVPW: 7-11 mm    Review of the study shows there is no pericardial effusion.  Left  ventricle is borderline enlarged.  Normal systolic function.  Ejection  fraction at 55%.  Left atrium is borderline enlarged; volume is 63 cc  and indexed it is 33 cc per meters squared. The right  ventricle  appears normal on 2-D study.  Mitral valve has a slight amount of  mitral regurgitation.  The aortic valve is normal.  There is trace  tricuspid regurgitation.  Unable to estimate the right ventricle  systolic pressure.  There is trace pulmonic valve insufficiency.  Diastolic indices show reversal of the E to A ratio at 0.86, which  suggests diastolic dysfunction.            Continued on page 2        ASSESSMENT:  Echocardiographic study revealing  1.  Borderline left ventricle enlargement with normal systolic  function.  Ejection fraction at 55%.  2.  Borderline left atrial enlargement.  3.  Normal right ventricle size and function.  4.  Slight mitral regurgitation.  5.  Normal aortic valve.  6.  Trace tricuspid regurgitation.  Unable to estimate the right  ventricle systolic pressure.  7.  Trace pulmonic valve insufficiency.  8.  Evidence of probable diastolic dysfunction.  Exam Date: Mar 22, 2017 08:12:00 AM  Author: MATTHIAS BORREGO  This report is preliminary and transcribed          ASSESSMENT/PLAN:                                                    1. Panlobular emphysema (H)  She is having a lot of wheezing and trouble with activity.   Cold gives a lot of bronchospasm.   We are going to add in Breo as Symbicort is not helping.  She is Seeing pulmonary in April and so we will have all the testing completed and CT of her chest done. She has been smoke free a year and congratulated.   - fluticasone-vilanterol (BREO ELLIPTA) 200-25 MCG/INH oral inhaler; Inhale 1 puff into the lungs daily  Dispense: 1 Inhaler; Refill: 1  - tiotropium (SPIRIVA HANDIHALER) 18 MCG capsule; Inhale contents of one capsule daily.  Dispense: 30 capsule; Refill: 1  - CT Chest w Contrast; Future  - CT Chest w Contrast  - CBC with platelets differential  - Echocardiogram; Future  - Echocardiogram    2. Essential hypertension  Echo shows diastolic dysfunction, ace will be added. Recheck in a month.   - Comprehensive metabolic  panel  - CBC with platelets differential  - TSH with free T4 reflex    3. Elevated cholesterol  Recheck labs.  See us back once all returned.   - Comprehensive metabolic panel  - TSH with free T4 reflex  - Lipid Profile      Follow up with Provider - one month.      JUSTIN Lira  [facility]

## 2017-03-20 NOTE — NURSING NOTE
"Chief Complaint   Patient presents with     OTher     Follow up PFT testing        Initial /82 (BP Location: Left arm, Patient Position: Chair, Cuff Size: Adult Regular)  Pulse 72  Temp 98.3  F (36.8  C) (Tympanic)  Ht 5' 4.5\" (1.638 m)  Wt 184 lb (83.5 kg)  SpO2 93%  BMI 31.1 kg/m2 Estimated body mass index is 31.1 kg/(m^2) as calculated from the following:    Height as of this encounter: 5' 4.5\" (1.638 m).    Weight as of this encounter: 184 lb (83.5 kg).  Medication Reconciliation: complete     CRYSTAL WOODS      "

## 2017-03-22 ENCOUNTER — HOSPITAL ENCOUNTER (OUTPATIENT)
Dept: CT IMAGING | Facility: HOSPITAL | Age: 62
Discharge: HOME | End: 2017-03-22
Attending: PHYSICIAN ASSISTANT | Admitting: PHYSICIAN ASSISTANT
Payer: COMMERCIAL

## 2017-03-22 ENCOUNTER — HOSPITAL ENCOUNTER (OUTPATIENT)
Dept: ULTRASOUND IMAGING | Facility: HOSPITAL | Age: 62
End: 2017-03-22
Attending: PHYSICIAN ASSISTANT
Payer: COMMERCIAL

## 2017-03-22 PROCEDURE — 93306 TTE W/DOPPLER COMPLETE: CPT | Mod: TC

## 2017-03-22 PROCEDURE — 93306 TTE W/DOPPLER COMPLETE: CPT | Mod: 26 | Performed by: INTERNAL MEDICINE

## 2017-03-22 PROCEDURE — 71260 CT THORAX DX C+: CPT | Mod: TC

## 2017-03-22 RX ORDER — IOPAMIDOL 612 MG/ML
100 INJECTION, SOLUTION INTRAVASCULAR ONCE
Status: COMPLETED | OUTPATIENT
Start: 2017-03-22 | End: 2017-03-22

## 2017-03-22 RX ADMIN — IOPAMIDOL 100 ML: 612 INJECTION, SOLUTION INTRAVASCULAR at 07:37

## 2017-03-23 PROBLEM — I51.89 DIASTOLIC DYSFUNCTION: Status: ACTIVE | Noted: 2017-03-23

## 2017-03-23 RX ORDER — LISINOPRIL 5 MG/1
5 TABLET ORAL DAILY
Qty: 90 TABLET | Refills: 1 | Status: SHIPPED | OUTPATIENT
Start: 2017-03-23 | End: 2017-06-14

## 2017-03-24 ENCOUNTER — TELEPHONE (OUTPATIENT)
Dept: FAMILY MEDICINE | Facility: OTHER | Age: 62
End: 2017-03-24

## 2017-05-04 ENCOUNTER — TRANSFERRED RECORDS (OUTPATIENT)
Dept: HEALTH INFORMATION MANAGEMENT | Facility: HOSPITAL | Age: 62
End: 2017-05-04

## 2017-05-18 DIAGNOSIS — J43.1 PANLOBULAR EMPHYSEMA (H): ICD-10-CM

## 2017-05-19 RX ORDER — TIOTROPIUM BROMIDE 18 UG/1
CAPSULE ORAL; RESPIRATORY (INHALATION)
Qty: 30 CAPSULE | Refills: 2 | Status: SHIPPED | OUTPATIENT
Start: 2017-05-19 | End: 2017-06-14

## 2017-06-08 ENCOUNTER — HOSPITAL ENCOUNTER (OUTPATIENT)
Dept: CARDIAC REHAB | Facility: HOSPITAL | Age: 62
Setting detail: THERAPIES SERIES
End: 2017-06-08
Attending: INTERNAL MEDICINE
Payer: COMMERCIAL

## 2017-06-08 VITALS — BODY MASS INDEX: 31.79 KG/M2 | HEIGHT: 65 IN | WEIGHT: 190.8 LBS

## 2017-06-08 PROCEDURE — G0424 PULMONARY REHAB W EXER: HCPCS

## 2017-06-08 ASSESSMENT — PULMONARY FUNCTION TESTS
FVC_PERCENT_PREDICTED: 129
FEV1 (%PREDICTED): 65
FEV1/FVC: 38
FEV1: 1.55
FVC: 4.12

## 2017-06-08 ASSESSMENT — 6 MINUTE WALK TEST (6MWT)
GENDER SELECTION: FEMALE
FEMALE CALC: 446.84
TOTAL DISTANCE WALKED: 342.9
MALE CALC: 466.89

## 2017-06-08 ASSESSMENT — DUKE ACTIVITY SCORE INDEX (DASI)
DASI METS SCORE: 5.72
VO2_PEAK: 20.01

## 2017-06-08 NOTE — PROGRESS NOTES
" 06/08/17 0800   Session   Session Initial Evaluation and Exercise Prescription   Certified through this date 07/07/17   Type Initial   General Information   Treatment Diagnosis COPD   Classification of COPD Stage 2 (Moderate)   Onset Date 12/01/13   Hospital Location Not hospitalized   Current Signs and Symptoms SOB   Comments Patient does not have other co-morbidities.   Outpatient Pulmonary Rehab Start Date 06/08/17   Primary Physician Elsa Zuleta   Pulmonolgist [facility]   General Information Comments Patient states she is generally a healthy person.    Medical History/Comorbidities   Medical History Comments Patient does not have any co-morbidities.   Untoward Events/Exacerbations/Hospitalizations   Untoward Events/Exacerbations/Hospitalizations None   Sputum   Sputum Production Amount small amounts   Sputum Production Color Clear   Sputum Production Consistency Thick   Tobacco History   Tobacco Former smoker   Tobacco Habit Cigarettes   Years Smoked 40   Average Packs Per Day 1   Quit Date or Planned Quit Date 12/01/13   Interventions Planned None: Patient is in maintenance   Medications   Long-Acting Beta Agonist Prescribed, taking as prescribed   Short-Acting Beta Agonist Prescribed, taking as prescribed   Long-Acting Anticholinergic Prescribed, taking as prescribed   Short-Acting Anticholinergic Not prescribed   Inhaled Corticosteroid Prescribed, taking as prescribed   Oral Corticosteroid Not prescribed   Medications Reconciled By Patient   Preventative Vaccinations   Influenza Vaccination Yes   Pneumonia Vaccination Yes   Pain   Patient Currently in Pain No   Falls Screen   Have you fallen two or more times in the past year? No   Have you fallen and had an injury in the past year? No   Comment Patient was knocked down by her \"brute\" of a dog last week.    Living and Work Status   Living Arrangements house   Support System Live with an adult   Environmental Factors Pets   ADL Limitations None   Initial " "Rodriguez Activity Status Index (DASI) score. A measure of functional capacity. The goal is to have a pre-program raw score of 9.95 (~4 METs) or above 24.2   Initial DASI VO2 Peak (ml*kg-1*min-1) 20.01   Initial DASI MET Level 5.72   Occupation Retired - was a    Return to Employment Retired   Physical Assessments   Incisions Not applicable   Edema None   Left Lung Sounds normal   Right Lung Sounds normal   Comments Patient's physical assessments are WNL.   Pulmonary Function Test (PFTs)   PFT Results Available   Date Completed 02/07/17   FVC Actual 4.12   % Predicted    FEV1 Actual 1.55   % Predicted FEV1 65   FEV1/FEV Ratio 38   Inspiratory Capacity (IC) Actual (L) 2.5   % Predicted    Uncorrected DLCO 12.08   % Predicted Uncorrected DLCO 49   Pre/Post Bronchodilator Post   Airway Obstruction Moderate   Individualized Treatment Plan   Sessions Scheduled 18   Sessions Attended 1   Type Aerobic exercise;Resistance training;Flexibility training   Oxygen Use   Supplemental Oxygen Needed No   Exercise Prescription   Mode Treadmill;Recumbant bike;Arm Ergometer/UBE   Frequency 2 days/week   Duration/Time 30-45 min   THR (85% of age predicted max heart rate)  132.6   Effort Rating (0-10) 4-6/10   Progression of Exercise Increase by .5 METs per week.    Oxygen Titration with Exercise NA   Comments Patient is hoping to increase her aerobic activity.    Exercise Assessment   6 Minute Walk Predicted - Gender Selection Female   6 Minute Walk Predicted (Female) 446.84   6 Minute Walk Distance (Initial) 342.9 Meters   Resting HR 63   Exercise HR 94   Resting /73   Exercise /70   SpO2 97   Exercise SpO2 88   Exercise Tolerance good   Normal Limits Discussed Yes   Current Symptoms at Home Denies symptoms   Current Symptoms in Rehab None   Limitations Patient does not have physical limitations.    Nutrition Management   Age 62   Height 1.651 m (5' 5\")   Weight 86.5 kg (190 lb 12.8 oz)   BMI " (Calculated) 31.82   Assessment Overweight   Goal weight 72.6 kg (160 lb)   Interventions Planned Attend group nutrition class   Psychosocial   Initial Patient Health Questionnaire -9 (PHQ-9) for depression. To notify physician if pre-score >9. 2   Initial [facility] Survey score. A quality of life measure. To re evaluate if total score is > 25. Also consider PHQ-9 and DASI to determine if patient should be referred back to physician. 22   Initial Shortness of Breath Questionnaire (SOBQ) score. The goal is to reduce the score pre to post program. 36   Intervention Planned Patient to identify 2-3 coping mechanisms to decrease stress and anxiety;Patient to attend appropriate education class;Use breathing techniques to control dyspnea cycle   Psychosocial Comments Patient states she takes medication for depression which is helpful.   Stages of Change   Aerobic Exercise Preparation   Physical Activity Action   Recommended diet Preparation   Stress Action   Smoking Cessation NA   Oxygen Usage NA   Current Home Exercise   Type of Exercise Walking   Recommended Home Exercise Prescription   Type of Exercise Walking;Calisthenics;LE Strengthening Exercise;Treadmill   Frequency (Days per week) 5-6   Duration (minutes per session) 15-30 min   Effort Rating Recommended (0-10) Scale  4-6/10   30 Day Exercise Plan Patient will start walking outside again.   Learning Assessment   Learner Patient   Primary Language English   Preferred Learning Style Listening;Demonstration   Barriers to Learning No barriers noted   Patient Education/Referrals   Education Recommended Activities of Daily Living;Air Quality;Airway Clearance;Anatomy and Disease Process;Breathing Techniques;Emotional Aspects of CLD;Energy Conservation;Exercise Principles;Medication Overview;Home Oxygen Equipment;Nutrition   Follow-up/On-going Support   Provider follow-up needed on the following No follow-up needed   Pulmonary Rehab Goals   Pulmonary Rehab Goals 1;2;3    Goal 1   Goal Patient would like to get back to having a regular exercise routine that she can continue after MS.     Target Date 08/08/17   Goal 2   Goal Patient would like to be able to go up stairs without becoming short of breath.    Target Date 08/08/17   Goal 3   Goal Patient would like to improve her diet by learning healthier eating habits.    Target Date 08/08/17   Assessment   Assessment 6/8: Hortensia started Pulmonary Rehab today after referral from her pulmonologist as she is becoming more short of breath in the recent months. She states she is quite active at home and is always busy doing something. She cares for her grandchildren 2 days a week. She has difficulty walking up  stairs due to shortness of breath and would like to improve her SOB on stairs. With all of her physical activity, she says she has gotten away from aerobic exercise and wants to get back to exercising. She has a treadmill at home she states she can start using again. [place] and would like to start walking again. She does not have any co-morbidities and should be able to exercise without problems. She is also wishing to learn more about healthy eating and improving her diet. Respiratory therapy is recommended to help her reach her goals of getting back to a healthier lifestyle by monitoring her response to exercise and help her safely progress to increased activity performance with less shortness of breath..    I have reviewed initial evaluation outcomes including patient's response to initial activity assessment, and agree with exercise prescription for pulmonary rehabilitation for this patient.

## 2017-06-13 ENCOUNTER — HOSPITAL ENCOUNTER (OUTPATIENT)
Dept: CARDIAC REHAB | Facility: HOSPITAL | Age: 62
Setting detail: THERAPIES SERIES
End: 2017-06-13
Attending: INTERNAL MEDICINE
Payer: COMMERCIAL

## 2017-06-13 VITALS — BODY MASS INDEX: 31.45 KG/M2 | WEIGHT: 189 LBS

## 2017-06-13 PROCEDURE — G0424 PULMONARY REHAB W EXER: HCPCS

## 2017-06-14 ENCOUNTER — OFFICE VISIT (OUTPATIENT)
Dept: FAMILY MEDICINE | Facility: OTHER | Age: 62
End: 2017-06-14
Attending: PHYSICIAN ASSISTANT
Payer: COMMERCIAL

## 2017-06-14 VITALS
TEMPERATURE: 97.9 F | WEIGHT: 190 LBS | HEART RATE: 72 BPM | SYSTOLIC BLOOD PRESSURE: 120 MMHG | BODY MASS INDEX: 31.62 KG/M2 | OXYGEN SATURATION: 94 % | DIASTOLIC BLOOD PRESSURE: 76 MMHG

## 2017-06-14 DIAGNOSIS — J43.1 PANLOBULAR EMPHYSEMA (H): ICD-10-CM

## 2017-06-14 DIAGNOSIS — I51.89 DIASTOLIC DYSFUNCTION: ICD-10-CM

## 2017-06-14 DIAGNOSIS — Z12.31 ENCOUNTER FOR SCREENING MAMMOGRAM FOR BREAST CANCER: Primary | ICD-10-CM

## 2017-06-14 DIAGNOSIS — Z71.89 ACP (ADVANCE CARE PLANNING): Chronic | ICD-10-CM

## 2017-06-14 DIAGNOSIS — Z12.11 SPECIAL SCREENING FOR MALIGNANT NEOPLASMS, COLON: ICD-10-CM

## 2017-06-14 DIAGNOSIS — Z78.0 ASYMPTOMATIC POSTMENOPAUSAL ESTROGEN DEFICIENCY: ICD-10-CM

## 2017-06-14 DIAGNOSIS — F41.1 GAD (GENERALIZED ANXIETY DISORDER): ICD-10-CM

## 2017-06-14 PROCEDURE — 99214 OFFICE O/P EST MOD 30 MIN: CPT | Performed by: PHYSICIAN ASSISTANT

## 2017-06-14 RX ORDER — LORAZEPAM 0.5 MG/1
0.5 TABLET ORAL EVERY 8 HOURS PRN
Qty: 10 TABLET | Refills: 0 | Status: SHIPPED | OUTPATIENT
Start: 2017-06-14

## 2017-06-14 RX ORDER — TIOTROPIUM BROMIDE 18 UG/1
CAPSULE ORAL; RESPIRATORY (INHALATION)
Qty: 30 CAPSULE | Refills: 11 | Status: SHIPPED | OUTPATIENT
Start: 2017-06-14 | End: 2018-06-13

## 2017-06-14 RX ORDER — ESCITALOPRAM OXALATE 20 MG/1
20 TABLET ORAL DAILY
Qty: 90 TABLET | Refills: 3 | Status: SHIPPED | OUTPATIENT
Start: 2017-06-14 | End: 2018-06-13

## 2017-06-14 RX ORDER — LISINOPRIL 5 MG/1
5 TABLET ORAL DAILY
Qty: 90 TABLET | Refills: 1 | Status: SHIPPED | OUTPATIENT
Start: 2017-06-14 | End: 2017-12-13

## 2017-06-14 RX ORDER — ALBUTEROL SULFATE 90 UG/1
2 AEROSOL, METERED RESPIRATORY (INHALATION) EVERY 6 HOURS PRN
Qty: 3 INHALER | Refills: 4 | Status: SHIPPED | OUTPATIENT
Start: 2017-06-14 | End: 2018-06-13

## 2017-06-14 ASSESSMENT — ANXIETY QUESTIONNAIRES
7. FEELING AFRAID AS IF SOMETHING AWFUL MIGHT HAPPEN: NOT AT ALL
5. BEING SO RESTLESS THAT IT IS HARD TO SIT STILL: NOT AT ALL
IF YOU CHECKED OFF ANY PROBLEMS ON THIS QUESTIONNAIRE, HOW DIFFICULT HAVE THESE PROBLEMS MADE IT FOR YOU TO DO YOUR WORK, TAKE CARE OF THINGS AT HOME, OR GET ALONG WITH OTHER PEOPLE: NOT DIFFICULT AT ALL
3. WORRYING TOO MUCH ABOUT DIFFERENT THINGS: NOT AT ALL
2. NOT BEING ABLE TO STOP OR CONTROL WORRYING: NOT AT ALL
6. BECOMING EASILY ANNOYED OR IRRITABLE: SEVERAL DAYS
1. FEELING NERVOUS, ANXIOUS, OR ON EDGE: NOT AT ALL
GAD7 TOTAL SCORE: 1

## 2017-06-14 ASSESSMENT — PATIENT HEALTH QUESTIONNAIRE - PHQ9: 5. POOR APPETITE OR OVEREATING: NOT AT ALL

## 2017-06-14 ASSESSMENT — PAIN SCALES - GENERAL: PAINLEVEL: NO PAIN (0)

## 2017-06-14 NOTE — PROGRESS NOTES
SUBJECTIVE:                                                    Latrice Henao is a 62 year old female who presents to clinic today for the following health issues:          Hyperlipidemia Follow-Up      Rate your low fat/cholesterol diet?: good    Taking statin?  No    Other lipid medications/supplements?:  none     Hypertension Follow-up      Outpatient blood pressures are not being checked.    Low Salt Diet: no added salt       Amount of exercise or physical activity: 2-3 days/week for an average of 45-60 minutes    Problems taking medications regularly: No    Medication side effects: none    Diet: regular (no restrictions)         Problem list and histories reviewed & adjusted, as indicated.  Additional history: as documented    Patient Active Problem List   Diagnosis     COPD (chronic obstructive pulmonary disease) (H)     Hypertension     ACP (advance care planning)     Elevated cholesterol     Diastolic dysfunction     Past Surgical History:   Procedure Laterality Date     BLEPHAROPLASTY, BROW LIFT BILATERAL, COMBINED Bilateral 6/2/2016    Procedure: COMBINED BLEPHAROPLASTY, BROW LIFT BILATERAL;  Surgeon: David Vitale MD;  Location: [facility]       Social History   Substance Use Topics     Smoking status: Former Smoker     Packs/day: 0.50     Years: 40.00     Types: Cigarettes     Quit date: 12/28/2012     Smokeless tobacco: Not on file      Comment: [employer]: current every day smoker - passive smoke exposure     Alcohol use No     Family History   Problem Relation Age of Onset     Myocardial Infarction Paternal Grandmother      myocardial infarction - cause of death     CANCER Father      lymphoma         Current Outpatient Prescriptions   Medication Sig Dispense Refill     SPIRIVA HANDIHALER 18 MCG capsule INHALE CONTENTS OF 1 CAPSULE DAILY 30 capsule 2     lisinopril (PRINIVIL/ZESTRIL) 5 MG tablet Take 1 tablet (5 mg) by mouth daily 90 tablet 1     fluticasone-vilanterol (BREO ELLIPTA) 200-25  MCG/INH oral inhaler Inhale 1 puff into the lungs daily 1 Inhaler 1     albuterol (ALBUTEROL) 108 (90 BASE) MCG/ACT Inhaler Inhale 2 puffs into the lungs every 6 hours as needed 3 Inhaler 4     escitalopram (LEXAPRO) 20 MG tablet Take 1 tablet (20 mg) by mouth daily 90 tablet 3     loratadine 10 MG capsule Take 10 mg by mouth daily 30 capsule 11     LORazepam (ATIVAN) 0.5 MG tablet Take 1 tablet (0.5 mg) by mouth every 8 hours as needed for anxiety 10 tablet 0     No Known Allergies  Recent Labs   Lab Test  03/20/17   1207  06/24/16   0945  09/08/15   0815  06/08/15   0925  09/23/13   0846   A1C   --    --   5.9   --   6.1   LDL  79  114*   --   123  114   HDL  76  83   --   71  70*   TRIG  55  66   --   89  63   ALT  27  30   --   30  31   CR  0.71  0.68   --   0.75  0.82   GFRESTIMATED  83  87   --   78  71   GFRESTBLACK  >90   GFR Calc    >90   GFR Calc     --   >90   GFR Calc    86   POTASSIUM  4.2  3.7   --   3.9  4.0   TSH  0.58  0.77   --   0.95  0.47      BP Readings from Last 3 Encounters:   06/14/17 120/76   03/20/17 142/82   01/06/17 122/76    Wt Readings from Last 3 Encounters:   06/14/17 190 lb (86.2 kg)   06/13/17 189 lb (85.7 kg)   06/08/17 190 lb 12.8 oz (86.5 kg)                    Reviewed and updated as needed this visit by clinical staff  Tobacco  Allergies  Meds  Med Hx  Surg Hx  Fam Hx  Soc Hx      Reviewed and updated as needed this visit by Provider         ROS:  Constitutional, neuro, ENT, endocrine, pulmonary, cardiac, gastrointestinal, genitourinary, musculoskeletal, integument and psychiatric systems are negative, except as otherwise noted.    OBJECTIVE:                                                    /76 (BP Location: Left arm, Patient Position: Chair, Cuff Size: Adult Regular)  Pulse 72  Temp 97.9  F (36.6  C) (Tympanic)  Wt 190 lb (86.2 kg)  SpO2 94%  BMI 31.62 kg/m2  Body mass index is 31.62 kg/(m^2).  GENERAL  APPEARANCE: healthy, alert and no distress  EYES: Eyes grossly normal to inspection, PERRL and conjunctivae and sclerae normal  HENT: ear canals and TM's normal and nose and mouth without ulcers or lesions  NECK: no adenopathy, no asymmetry, masses, or scars and thyroid normal to palpation  RESP: lungs clear to auscultation - no rales, rhonchi or wheezes  CV: regular rates and rhythm, normal S1 S2, no S3 or S4 and no murmur, click or rub  LYMPHATICS: normal ant/post cervical and supraclavicular nodes  ABDOMEN: soft, nontender, without hepatosplenomegaly or masses and bowel sounds normal  MS: extremities normal- no gross deformities noted  SKIN: no suspicious lesions or rashes  NEURO: Normal strength and tone, mentation intact and speech normal  PSYCH: mentation appears normal and affect normal/bright    Diagnostic test results:  Diagnostic Test Results:  No results found for this or any previous visit (from the past 24 hour(s)).  Results for orders placed or performed in visit on 03/20/17   CT Chest w Contrast    Narrative    CT SCAN OF CHEST WITH IV CONTRAST    REPORT:  There is interstitial thickening seen in both lung bases,  worse on the right than the left.  The degree of interstitial  thickening is mild. The remainder of the lungs are clear.  No hilar or  mediastinal masses or lymphadenopathy is noted.  No pleural  abnormalities are seen.  Axillary and supraclavicular lymph nodes  appear normal.    IMPRESSION:  MILD INTERSTITIAL THICKENING, WORSE ON THE RIGHT THAN THE  LEFT, AT BOTH LUNG BASES.  Exam Date: Mar 22, 2017 07:40:00 AM  Author: LARRY MACDONALD  This report is final and signed     Comprehensive metabolic panel   Result Value Ref Range    Sodium 141 133 - 144 mmol/L    Potassium 4.2 3.4 - 5.3 mmol/L    Chloride 107 94 - 109 mmol/L    Carbon Dioxide 28 20 - 32 mmol/L    Anion Gap 6 3 - 14 mmol/L    Glucose 93 70 - 99 mg/dL    Urea Nitrogen 13 7 - 30 mg/dL    Creatinine 0.71 0.52 - 1.04 mg/dL    GFR  Estimate 83 >60 mL/min/1.7m2    GFR Estimate If Black >90   GFR Calc   >60 mL/min/1.7m2    Calcium 8.5 8.5 - 10.1 mg/dL    Bilirubin Total 0.5 0.2 - 1.3 mg/dL    Albumin 3.9 3.4 - 5.0 g/dL    Protein Total 7.5 6.8 - 8.8 g/dL    Alkaline Phosphatase 57 40 - 150 U/L    ALT 27 0 - 50 U/L    AST 14 0 - 45 U/L   CBC with platelets differential   Result Value Ref Range    WBC 4.5 4.0 - 11.0 10e9/L    RBC Count 4.57 3.8 - 5.2 10e12/L    Hemoglobin 13.2 11.7 - 15.7 g/dL    Hematocrit 40.6 35.0 - 47.0 %    MCV 89 78 - 100 fl    MCH 28.9 26.5 - 33.0 pg    MCHC 32.5 31.5 - 36.5 g/dL    RDW 12.8 10.0 - 15.0 %    Platelet Count 260 150 - 450 10e9/L    Diff Method Automated Method     % Neutrophils 54.2 %    % Lymphocytes 33.1 %    % Monocytes 6.7 %    % Eosinophils 4.0 %    % Basophils 1.8 %    % Immature Granulocytes 0.2 %    Nucleated RBCs 0 0 /100    Absolute Neutrophil 2.4 1.6 - 8.3 10e9/L    Absolute Lymphocytes 1.5 0.8 - 5.3 10e9/L    Absolute Monocytes 0.3 0.0 - 1.3 10e9/L    Absolute Eosinophils 0.2 0.0 - 0.7 10e9/L    Absolute Basophils 0.1 0.0 - 0.2 10e9/L    Abs Immature Granulocytes 0.0 0 - 0.4 10e9/L    Absolute Nucleated RBC 0.0    TSH with free T4 reflex   Result Value Ref Range    TSH 0.58 0.40 - 4.00 mU/L   Lipid Profile   Result Value Ref Range    Cholesterol 166 <200 mg/dL    Triglycerides 55 <150 mg/dL    HDL Cholesterol 76 >49 mg/dL    LDL Cholesterol Calculated 79 <100 mg/dL    Non HDL Cholesterol 90 <130 mg/dL   Echocardiogram    Narrative    ECHOCARDIOGRAM    M-mode, two-dimensional, color-flow, and Doppler studies were obtained  on this patient.  Standard views were utilized.    ORDERING PHYSICIAN:  FRANCISCA Mayberry    INDICATIONS:  Emphysema    Cardiac Dimensions                                    Normal  Dimensions  Aortic Root:                             31.7 mm      Aortic Root  Diameter: 20-37 mm  Left Atrium:                             27.3 mm      Left Atrium:  19-40 mm  Right  Ventricle:                not measured      Right Ventricle:  7-23 mm  Left Ventricle end-diastole:    58.6 mm      Left Ventricle  end-diastole: 35-56 mm  Left Ventricle end-systole:     42.3 mm      Left Ventricle  end-systole: 35-56 mm  IVS end-diastole:                      9.3 mm      IVS end-diastole:  7-11 mm  LVPW:                                      8.4 mm      LVPW: 7-11 mm    Review of the study shows there is no pericardial effusion.  Left  ventricle is borderline enlarged.  Normal systolic function.  Ejection  fraction at 55%.  Left atrium is borderline enlarged; volume is 63 cc  and indexed it is 33 cc per meters squared. The right ventricle  appears normal on 2-D study.  Mitral valve has a slight amount of  mitral regurgitation.  The aortic valve is normal.  There is trace  tricuspid regurgitation.  Unable to estimate the right ventricle  systolic pressure.  There is trace pulmonic valve insufficiency.  Diastolic indices show reversal of the E to A ratio at 0.86, which  suggests diastolic dysfunction.            Continued on page 2        ASSESSMENT:  Echocardiographic study revealing  1.  Borderline left ventricle enlargement with normal systolic  function.  Ejection fraction at 55%.  2.  Borderline left atrial enlargement.  3.  Normal right ventricle size and function.  4.  Slight mitral regurgitation.  5.  Normal aortic valve.  6.  Trace tricuspid regurgitation.  Unable to estimate the right  ventricle systolic pressure.  7.  Trace pulmonic valve insufficiency.  8.  Evidence of probable diastolic dysfunction.  Exam Date: Mar 22, 2017 08:12:00 AM  Author: MATTHIAS BORREGO  This report is final and signed          ASSESSMENT/PLAN:                                                    1. ACP (advance care planning)  Given again.  She is aware of need for this.     2. Panlobular emphysema (H)  Seeing Dr. Grant.  Her PFT is up to date.  Her breathing is improved.   - albuterol (ALBUTEROL) 108 (90  BASE) MCG/ACT Inhaler; Inhale 2 puffs into the lungs every 6 hours as needed  Dispense: 3 Inhaler; Refill: 4  - tiotropium (SPIRIVA HANDIHALER) 18 MCG capsule; INHALE CONTENTS OF 1 CAPSULE DAILY  Dispense: 30 capsule; Refill: 11  - fluticasone-vilanterol (BREO ELLIPTA) 100-25 MCG/INH oral inhaler; Inhale 1 puff into the lungs daily  Dispense: 30 Inhaler; Refill: 6    3. AMY (generalized anxiety disorder)  This is helping her refill are given PHQ is showing good control.   - escitalopram (LEXAPRO) 20 MG tablet; Take 1 tablet (20 mg) by mouth daily  Dispense: 90 tablet; Refill: 3  - LORazepam (ATIVAN) 0.5 MG tablet; Take 1 tablet (0.5 mg) by mouth every 8 hours as needed for anxiety  Dispense: 10 tablet; Refill: 0    4. Diastolic dysfunction  She is going to be given a refill.  Her labs are done.  See us back 6 months.   - lisinopril (PRINIVIL/ZESTRIL) 5 MG tablet; Take 1 tablet (5 mg) by mouth daily  Dispense: 90 tablet; Refill: 1    5. Encounter for screening mammogram for breast cancer    - MA Screening Digital Bilateral; Future  - Immunos occult blood; Future  - MA Screening Digital Bilateral    6. Special screening for malignant neoplasms, colon    - Immunos occult blood; Future    7. Asymptomatic postmenopausal estrogen deficiency  Calcium and Vit D reviewed.   - DX Hip/Pelvis/Spine; Future  - Immunos occult blood; Future  - DX Hip/Pelvis/Spine      Follow up with Provider - 6 months   See Patient Instructions    JUSTIN Lira  [facility] JULI

## 2017-06-14 NOTE — NURSING NOTE
"Chief Complaint   Patient presents with     Lipids     Hypertension     *_* Health Care Directive *_*     declined       Initial /76 (BP Location: Left arm, Patient Position: Chair, Cuff Size: Adult Regular)  Pulse 72  Temp 97.9  F (36.6  C) (Tympanic)  Wt 190 lb (86.2 kg)  SpO2 94%  BMI 31.62 kg/m2 Estimated body mass index is 31.62 kg/(m^2) as calculated from the following:    Height as of 6/8/17: 5' 5\" (1.651 m).    Weight as of this encounter: 190 lb (86.2 kg).  Medication Reconciliation: complete   Loraine Chua CMA(AAMA)   "

## 2017-06-14 NOTE — PATIENT INSTRUCTIONS
My Asthma Action Plan  Name: Latrice Henao   YOB: 1955  Date: 6/14/2017   My doctor: JUSTIN Lira   My clinic: [facility] [place]        My Control Medicine: Fluticasone furoate + vilanterol (Breo Ellipta)-  200/25mcg inhaler  Tiotropium (Spiriva) -  Handihaler 18 mcg capsule  My Rescue Medicine: Albuterol (Proair/Ventolin/Proventil) inhaler 108(90 BASE) mcg/act inhaler    My Asthma Severity: moderate persistent  Avoid your asthma triggers: exercise or sports and cold air               GREEN ZONE   Good Control    I feel good    No cough or wheeze    Can work, sleep and play without asthma symptoms       Take your asthma control medicine every day.     1. If exercise triggers your asthma, take your rescue medication    15 minutes before exercise or sports, and    During exercise if you have asthma symptoms  2. Spacer to use with inhaler: If you have a spacer, make sure to use it with your inhaler             YELLOW ZONE Getting Worse  I have ANY of these:    I do not feel good    Cough or wheeze    Chest feels tight    Wake up at night   1. Keep taking your Green Zone medications  2. Start taking your rescue medicine:    every 20 minutes for up to 1 hour. Then every 4 hours for 24-48 hours.  3. If you stay in the Yellow Zone for more than 12-24 hours, contact your doctor.  4. If you do not return to the Green Zone in 12-24 hours or you get worse, start taking your oral steroid medicine if prescribed by your provider.           RED ZONE Medical Alert - Get Help  I have ANY of these:    I feel awful    Medicine is not helping    Breathing getting harder    Trouble walking or talking    Nose opens wide to breathe       1. Take your rescue medicine NOW  2. If your provider has prescribed an oral steroid medicine, start taking it NOW  3. Call your doctor NOW  4. If you are still in the Red Zone after 20 minutes and you have not reached your doctor:    Take your rescue medicine  again and    Call 911 or go to the emergency room right away    See your regular doctor within 2 weeks of an Emergency Room or Urgent Care visit for follow-up treatment.        Electronically signed by: Loraine Chua, June 14, 2017    Annual Reminders:  Meet with Asthma Educator,  Flu Shot in the Fall, consider Pneumonia Vaccination for patients with asthma (aged 19 and older).    Pharmacy: [address]                    Asthma Triggers  How To Control Things That Make Your Asthma Worse    Triggers are things that make your asthma worse.  Look at the list below to help you find your triggers and what you can do about them.  You can help prevent asthma flare-ups by staying away from your triggers.      Trigger                                                          What you can do   Cigarette Smoke  Tobacco smoke can make asthma worse. Do not allow smoking in your home, car or around you.  Be sure no one smokes at a child s day care or school.  If you smoke, ask your health care provider for ways to help you quit.  Ask family members to quit too.  Ask your health care provider for a referral to Quit Plan to help you quit smoking, or call 2-525-500-PLAN.     Colds, Flu, Bronchitis  These are common triggers of asthma. Wash your hands often.  Don t touch your eyes, nose or mouth.  Get a flu shot every year.     Dust Mites  These are tiny bugs that live in cloth or carpet. They are too small to see. Wash sheets and blankets in hot water every week.   Encase pillows and mattress in dust mite proof covers.  Avoid having carpet if you can. If you have carpet, vacuum weekly.   Use a dust mask and HEPA vacuum.   Pollen and Outdoor Mold  Some people are allergic to trees, grass, or weed pollen, or molds. Try to keep your windows closed.  Limit time out doors when pollen count is high.   Ask you health care provider about taking medicine during allergy season.     Animal Dander  Some  people are allergic to skin flakes, urine or saliva from pets with fur or feathers. Keep pets with fur or feathers out of your home.    If you can t keep the pet outdoors, then keep the pet out of your bedroom.  Keep the bedroom door closed.  Keep pets off cloth furniture and away from stuffed toys.     Mice, Rats, and Cockroaches  Some people are allergic to the waste from these pests.   Cover food and garbage.  Clean up spills and food crumbs.  Store grease in the refrigerator.   Keep food out of the bedroom.   Indoor Mold  This can be a trigger if your home has high moisture. Fix leaking faucets, pipes, or other sources of water.   Clean moldy surfaces.  Dehumidify basement if it is damp and smelly.   Smoke, Strong Odors, and Sprays  These can reduce air quality. Stay away from strong odors and sprays, such as perfume, powder, hair spray, paints, smoke incense, paint, cleaning products, candles and new carpet.   Exercise or Sports  Some people with asthma have this trigger. Be active!  Ask your doctor about taking medicine before sports or exercise to prevent symptoms.    Warm up for 5-10 minutes before and after sports or exercise.     Other Triggers of Asthma  Cold air:  Cover your nose and mouth with a scarf.  Sometimes laughing or crying can be a trigger.  Some medicines and food can trigger asthma.

## 2017-06-14 NOTE — MR AVS SNAPSHOT
After Visit Summary   6/14/2017    Latrice Henao    MRN: [id number]           Patient Information     Date Of Birth          1955        Visit Information        Provider Department      6/14/2017 9:30 AM Elsa Medrano PA [facility]        Today's Diagnoses     Encounter for screening mammogram for breast cancer    -  1    ACP (advance care planning)        Panlobular emphysema (H)        AMY (generalized anxiety disorder)        Diastolic dysfunction        Special screening for malignant neoplasms, colon        Asymptomatic postmenopausal estrogen deficiency          Care Instructions      My Asthma Action Plan  Name: Latrice Henao   YOB: 1955  Date: 6/14/2017   My doctor: JUSTIN Lira   My clinic: [facility] [place]        My Control Medicine: Fluticasone furoate + vilanterol (Breo Ellipta)-  200/25mcg inhaler  Tiotropium (Spiriva) -  Handihaler 18 mcg capsule  My Rescue Medicine: Albuterol (Proair/Ventolin/Proventil) inhaler 108(90 BASE) mcg/act inhaler    My Asthma Severity: moderate persistent  Avoid your asthma triggers: exercise or sports and cold air               GREEN ZONE   Good Control    I feel good    No cough or wheeze    Can work, sleep and play without asthma symptoms       Take your asthma control medicine every day.     1. If exercise triggers your asthma, take your rescue medication    15 minutes before exercise or sports, and    During exercise if you have asthma symptoms  2. Spacer to use with inhaler: If you have a spacer, make sure to use it with your inhaler             YELLOW ZONE Getting Worse  I have ANY of these:    I do not feel good    Cough or wheeze    Chest feels tight    Wake up at night   1. Keep taking your Green Zone medications  2. Start taking your rescue medicine:    every 20 minutes for up to 1 hour. Then every 4 hours for 24-48 hours.  3. If you stay in the Yellow Zone for more than 12-24  hours, contact your doctor.  4. If you do not return to the Green Zone in 12-24 hours or you get worse, start taking your oral steroid medicine if prescribed by your provider.           RED ZONE Medical Alert - Get Help  I have ANY of these:    I feel awful    Medicine is not helping    Breathing getting harder    Trouble walking or talking    Nose opens wide to breathe       1. Take your rescue medicine NOW  2. If your provider has prescribed an oral steroid medicine, start taking it NOW  3. Call your doctor NOW  4. If you are still in the Red Zone after 20 minutes and you have not reached your doctor:    Take your rescue medicine again and    Call 911 or go to the emergency room right away    See your regular doctor within 2 weeks of an Emergency Room or Urgent Care visit for follow-up treatment.        Electronically signed by: Loraine Chua, June 14, 2017    Annual Reminders:  Meet with Asthma Educator,  Flu Shot in the Fall, consider Pneumonia Vaccination for patients with asthma (aged 19 and older).    Pharmacy: [address]                    Asthma Triggers  How To Control Things That Make Your Asthma Worse    Triggers are things that make your asthma worse.  Look at the list below to help you find your triggers and what you can do about them.  You can help prevent asthma flare-ups by staying away from your triggers.      Trigger                                                          What you can do   Cigarette Smoke  Tobacco smoke can make asthma worse. Do not allow smoking in your home, car or around you.  Be sure no one smokes at a child s day care or school.  If you smoke, ask your health care provider for ways to help you quit.  Ask family members to quit too.  Ask your health care provider for a referral to Quit Plan to help you quit smoking, or call 6-733-119-PLAN.     Colds, Flu, Bronchitis  These are common triggers of asthma. Wash your hands often.  Don t touch  your eyes, nose or mouth.  Get a flu shot every year.     Dust Mites  These are tiny bugs that live in cloth or carpet. They are too small to see. Wash sheets and blankets in hot water every week.   Encase pillows and mattress in dust mite proof covers.  Avoid having carpet if you can. If you have carpet, vacuum weekly.   Use a dust mask and HEPA vacuum.   Pollen and Outdoor Mold  Some people are allergic to trees, grass, or weed pollen, or molds. Try to keep your windows closed.  Limit time out doors when pollen count is high.   Ask you health care provider about taking medicine during allergy season.     Animal Dander  Some people are allergic to skin flakes, urine or saliva from pets with fur or feathers. Keep pets with fur or feathers out of your home.    If you can t keep the pet outdoors, then keep the pet out of your bedroom.  Keep the bedroom door closed.  Keep pets off cloth furniture and away from stuffed toys.     Mice, Rats, and Cockroaches  Some people are allergic to the waste from these pests.   Cover food and garbage.  Clean up spills and food crumbs.  Store grease in the refrigerator.   Keep food out of the bedroom.   Indoor Mold  This can be a trigger if your home has high moisture. Fix leaking faucets, pipes, or other sources of water.   Clean moldy surfaces.  Dehumidify basement if it is damp and smelly.   Smoke, Strong Odors, and Sprays  These can reduce air quality. Stay away from strong odors and sprays, such as perfume, powder, hair spray, paints, smoke incense, paint, cleaning products, candles and new carpet.   Exercise or Sports  Some people with asthma have this trigger. Be active!  Ask your doctor about taking medicine before sports or exercise to prevent symptoms.    Warm up for 5-10 minutes before and after sports or exercise.     Other Triggers of Asthma  Cold air:  Cover your nose and mouth with a scarf.  Sometimes laughing or crying can be a trigger.  Some medicines and food can  trigger asthma.             Follow-ups after your visit        Future tests that were ordered for you today     Open Future Orders        Priority Expected Expires Ordered    Immunos occult blood Routine  6/14/2018 6/14/2017            Who to contact     If you have questions or need follow up information about today's clinic visit or your schedule please contact [facility] directly at [phone].  Normal or non-critical lab and imaging results will be communicated to you by MyChart, letter or phone within 4 business days after the clinic has received the results. If you do not hear from us within 7 days, please contact the clinic through [employer] or phone. If you have a critical or abnormal lab result, we will notify you by phone as soon as possible.  Submit refill requests through [employer] or call your pharmacy and they will forward the refill request to us. Please allow 3 business days for your refill to be completed.          Additional Information About Your Visit        [employer] Information     [employer] gives you secure access to your electronic health record. If you see a primary care provider, you can also send messages to your care team and make appointments. If you have questions, please call your primary care clinic.  If you do not have a primary care provider, please call [phone] and they will assist you.        Care EveryWhere ID     This is your Care EveryWhere ID. This could be used by other organizations to access your [facility] medical records  [id number]        Your Vitals Were     Pulse Temperature Pulse Oximetry BMI (Body Mass Index)          72 97.9  F (36.6  C) (Tympanic) 94% 31.62 kg/m2         Blood Pressure from Last 3 Encounters:   06/14/17 120/76   03/20/17 142/82   01/06/17 122/76    Weight from Last 3 Encounters:   06/14/17 190 lb (86.2 kg)   06/13/17 189 lb (85.7 kg)   06/08/17 190 lb 12.8 oz (86.5 kg)                 Today's Medication Changes          These changes  are accurate as of: 6/14/17 10:30 AM.  If you have any questions, ask your nurse or doctor.               Start taking these medicines.        Dose/Directions    fluticasone-vilanterol 100-25 MCG/INH oral inhaler   Commonly known as:  BREO ELLIPTA   Used for:  Panlobular emphysema (H)   Replaces:  fluticasone-vilanterol 200-25 MCG/INH oral inhaler   Started by:  Elsa Medrano PA        Dose:  1 puff   Inhale 1 puff into the lungs daily   Quantity:  30 Inhaler   Refills:  6         These medicines have changed or have updated prescriptions.        Dose/Directions    tiotropium 18 MCG capsule   Commonly known as:  SPIRIVA HANDIHALER   This may have changed:  See the new instructions.   Used for:  Panlobular emphysema (H)   Changed by:  Elsa Medrano PA        INHALE CONTENTS OF 1 CAPSULE DAILY   Quantity:  30 capsule   Refills:  11         Stop taking these medicines if you haven't already. Please contact your care team if you have questions.     fluticasone-vilanterol 200-25 MCG/INH oral inhaler   Commonly known as:  BREO ELLIPTA   Replaced by:  fluticasone-vilanterol 100-25 MCG/INH oral inhaler   Stopped by:  Elsa Medrano PA                Where to get your medicines      These medications were sent to [address] [address]     Phone:  [phone]     albuterol 108 (90 BASE) MCG/ACT Inhaler    escitalopram 20 MG tablet    fluticasone-vilanterol 100-25 MCG/INH oral inhaler    lisinopril 5 MG tablet    tiotropium 18 MCG capsule         Some of these will need a paper prescription and others can be bought over the counter.  Ask your nurse if you have questions.     Bring a paper prescription for each of these medications     LORazepam 0.5 MG tablet                Primary Care Provider Office Phone # Fax #    JUSTIN Bansal [phone]       [facility] [address] [place] [address]        Thank  you!     Thank you for choosing [facility]  for your care. Our goal is always to provide you with excellent care. Hearing back from our patients is one way we can continue to improve our services. Please take a few minutes to complete the written survey that you may receive in the mail after your visit with us. Thank you!             Your Updated Medication List - Protect others around you: Learn how to safely use, store and throw away your medicines at www.disposemymeds.org.          This list is accurate as of: 6/14/17 10:30 AM.  Always use your most recent med list.                   Brand Name Dispense Instructions for use    albuterol 108 (90 BASE) MCG/ACT Inhaler    albuterol    3 Inhaler    Inhale 2 puffs into the lungs every 6 hours as needed       escitalopram 20 MG tablet    LEXAPRO    90 tablet    Take 1 tablet (20 mg) by mouth daily       fluticasone-vilanterol 100-25 MCG/INH oral inhaler    BREO ELLIPTA    30 Inhaler    Inhale 1 puff into the lungs daily       lisinopril 5 MG tablet    PRINIVIL/ZESTRIL    90 tablet    Take 1 tablet (5 mg) by mouth daily       loratadine 10 MG capsule     30 capsule    Take 10 mg by mouth daily       LORazepam 0.5 MG tablet    ATIVAN    10 tablet    Take 1 tablet (0.5 mg) by mouth every 8 hours as needed for anxiety       tiotropium 18 MCG capsule    SPIRIVA HANDIHALER    30 capsule    INHALE CONTENTS OF 1 CAPSULE DAILY

## 2017-06-15 ENCOUNTER — HOSPITAL ENCOUNTER (OUTPATIENT)
Dept: CARDIAC REHAB | Facility: HOSPITAL | Age: 62
Setting detail: THERAPIES SERIES
End: 2017-06-15
Attending: PHYSICIAN ASSISTANT
Payer: COMMERCIAL

## 2017-06-15 VITALS — BODY MASS INDEX: 31.45 KG/M2 | WEIGHT: 189 LBS

## 2017-06-15 PROCEDURE — G0424 PULMONARY REHAB W EXER: HCPCS

## 2017-06-15 ASSESSMENT — PATIENT HEALTH QUESTIONNAIRE - PHQ9: SUM OF ALL RESPONSES TO PHQ QUESTIONS 1-9: 2

## 2017-06-15 ASSESSMENT — ASTHMA QUESTIONNAIRES: ACT_TOTALSCORE: 16

## 2017-06-15 ASSESSMENT — ANXIETY QUESTIONNAIRES: GAD7 TOTAL SCORE: 1

## 2017-06-20 ENCOUNTER — HOSPITAL ENCOUNTER (OUTPATIENT)
Dept: CARDIAC REHAB | Facility: HOSPITAL | Age: 62
Setting detail: THERAPIES SERIES
End: 2017-06-20
Attending: INTERNAL MEDICINE
Payer: COMMERCIAL

## 2017-06-20 VITALS — WEIGHT: 190.2 LBS | BODY MASS INDEX: 31.65 KG/M2

## 2017-06-20 PROCEDURE — G0424 PULMONARY REHAB W EXER: HCPCS

## 2017-06-21 ENCOUNTER — HOSPITAL ENCOUNTER (OUTPATIENT)
Dept: GENERAL RADIOLOGY | Facility: HOSPITAL | Age: 62
Discharge: HOME | End: 2017-06-21
Attending: PHYSICIAN ASSISTANT | Admitting: PHYSICIAN ASSISTANT
Payer: COMMERCIAL

## 2017-06-21 DIAGNOSIS — Z78.0 ASYMPTOMATIC POSTMENOPAUSAL ESTROGEN DEFICIENCY: ICD-10-CM

## 2017-06-21 DIAGNOSIS — Z12.31 VISIT FOR SCREENING MAMMOGRAM: Primary | ICD-10-CM

## 2017-06-21 DIAGNOSIS — Z12.11 SPECIAL SCREENING FOR MALIGNANT NEOPLASMS, COLON: ICD-10-CM

## 2017-06-21 DIAGNOSIS — Z12.31 ENCOUNTER FOR SCREENING MAMMOGRAM FOR BREAST CANCER: ICD-10-CM

## 2017-06-21 LAB — HEMOCCULT SP1 STL QL: NEGATIVE

## 2017-06-21 PROCEDURE — 82274 ASSAY TEST FOR BLOOD FECAL: CPT | Performed by: PHYSICIAN ASSISTANT

## 2017-06-21 PROCEDURE — 77080 DXA BONE DENSITY AXIAL: CPT | Mod: TC

## 2017-06-21 PROCEDURE — G0202 SCR MAMMO BI INCL CAD: HCPCS | Mod: TC | Performed by: RADIOLOGY

## 2017-06-22 ENCOUNTER — HOSPITAL ENCOUNTER (OUTPATIENT)
Dept: CARDIAC REHAB | Facility: HOSPITAL | Age: 62
Setting detail: THERAPIES SERIES
End: 2017-06-22
Attending: INTERNAL MEDICINE
Payer: COMMERCIAL

## 2017-06-22 VITALS — BODY MASS INDEX: 31.62 KG/M2 | WEIGHT: 190 LBS

## 2017-06-22 PROCEDURE — G0424 PULMONARY REHAB W EXER: HCPCS

## 2017-06-27 ENCOUNTER — HOSPITAL ENCOUNTER (OUTPATIENT)
Dept: CARDIAC REHAB | Facility: HOSPITAL | Age: 62
Setting detail: THERAPIES SERIES
End: 2017-06-27
Attending: INTERNAL MEDICINE
Payer: COMMERCIAL

## 2017-06-27 VITALS — BODY MASS INDEX: 31.62 KG/M2 | WEIGHT: 190 LBS

## 2017-06-27 PROCEDURE — G0424 PULMONARY REHAB W EXER: HCPCS

## 2017-06-29 ENCOUNTER — HOSPITAL ENCOUNTER (OUTPATIENT)
Dept: CARDIAC REHAB | Facility: HOSPITAL | Age: 62
Setting detail: THERAPIES SERIES
End: 2017-06-29
Attending: INTERNAL MEDICINE
Payer: COMMERCIAL

## 2017-06-29 PROCEDURE — G0424 PULMONARY REHAB W EXER: HCPCS

## 2017-07-05 VITALS — WEIGHT: 191.6 LBS | HEIGHT: 65 IN | BODY MASS INDEX: 31.92 KG/M2

## 2017-07-05 ASSESSMENT — 6 MINUTE WALK TEST (6MWT)
MALE CALC: 466.25
TOTAL DISTANCE WALKED: 342.9
GENDER SELECTION: FEMALE
FEMALE CALC: 446

## 2017-07-05 ASSESSMENT — PULMONARY FUNCTION TESTS
FVC_PERCENT_PREDICTED: 129
FVC: 4.12
FEV1: 1.55
FEV1 (%PREDICTED): 65
FEV1/FVC: 38

## 2017-07-05 ASSESSMENT — DUKE ACTIVITY SCORE INDEX (DASI)
VO2_PEAK: 20.01
DASI METS SCORE: 5.72

## 2017-07-05 NOTE — PROGRESS NOTES
07/05/17 1100   Session   Session 30 Day Individualized Treatment Plan   Certified through this date 08/03/17   Type Reassessment   General Information   Treatment Diagnosis COPD   Classification of COPD Stage 2 (Moderate)   Onset Date 12/01/13   Hospital Location Not hospitalized   Comments Patient does not have other co-morbidities.   Outpatient Pulmonary Rehab Start Date 06/08/17   Primary Physician Elsa Zuleta   Pulmonolgist [place]   General Information Comments 7/5: Patient states she is generally a healthy person.    Medical History/Comorbidities   Medical History Comments Patient does not have any co-morbidities.   Untoward Events/Exacerbations/Hospitalizations   Untoward Events/Exacerbations/Hospitalizations None   Sputum   Sputum Production Amount small amounts   Sputum Production Color Clear   Sputum Production Consistency Thick   Tobacco History   Tobacco Former smoker   Tobacco Habit Cigarettes   Years Smoked 40   Average Packs Per Day 1   Quit Date or Planned Quit Date 12/01/13   Interventions Planned None: Patient is in maintenance   Medications   Long-Acting Beta Agonist Prescribed, taking as prescribed   Short-Acting Beta Agonist Prescribed, taking as prescribed   Long-Acting Anticholinergic Prescribed, taking as prescribed   Short-Acting Anticholinergic Not prescribed   Inhaled Corticosteroid Prescribed, taking as prescribed   Oral Corticosteroid Not prescribed   Medications Reconciled By Patient   Preventative Vaccinations   Influenza Vaccination Yes   Pneumonia Vaccination Yes   Pain   Patient Currently in Pain No   Falls Screen   Have you fallen two or more times in the past year? No   Have you fallen and had an injury in the past year? No   Comment 7/5: Patient is not at risk for falls at this time.    Living and Work Status   Living Arrangements house   Support System Live with an adult   Environmental Factors Pets   ADL Limitations None   Initial Duke Activity Status Index (DASI)  "score. A measure of functional capacity. The goal is to have a pre-program raw score of 9.95 (~4 METs) or above 24.2   Initial DASI VO2 Peak (ml*kg-1*min-1) 20.01   Initial DASI MET Level 5.72   Occupation Retired - was a    Return to Employment Retired   Physical Assessments   Incisions Not applicable   Edema None   Left Lung Sounds not assessed   Right Lung Sounds not assessed   Comments 7/5: Patient's physical assessments are WNL.   Pulmonary Function Test (PFTs)   PFT Results Available   Date Completed 02/07/17   FVC Actual 4.12   % Predicted    FEV1 Actual 1.55   % Predicted FEV1 65   FEV1/FEV Ratio 38   Inspiratory Capacity (IC) Actual (L) 2.5   % Predicted    Uncorrected DLCO 12.08   % Predicted Uncorrected DLCO 49   Pre/Post Bronchodilator Post   Airway Obstruction Moderate   Individualized Treatment Plan   Sessions Scheduled 18   Sessions Attended 7   Type Aerobic exercise;Resistance training;Flexibility training   Oxygen Use   Supplemental Oxygen Needed No   Exercise Prescription   Mode Treadmill;Recumbant bike;Arm Ergometer/UBE   Frequency 2 days/week   Duration/Time 30-45 min   THR (85% of age predicted max heart rate)  132.6   Effort Rating (0-10) 4-6/10   Progression of Exercise Increase by .5 METs per week.    Oxygen Titration with Exercise NA   Comments 7/5: Patient is working on increase her aerobic activity.    Exercise Assessment   6 Minute Walk Predicted - Gender Selection Female   6 Minute Walk Predicted (Female) 446   6 Minute Walk Predicted (Male) 466.25   6 Minute Walk Distance (Initial) 342.9 Meters   Resting HR 63   Resting /73   Exercise /70   SpO2 97   Exercise SpO2 88   Current MET level 2.55   Exercise Tolerance good   Normal Limits Discussed Yes   Current Symptoms at Home Denies symptoms   Current Symptoms in Rehab None   Limitations Patient does not have physical limitations.    Nutrition Management   Age 62   Height 1.651 m (5' 5\")   Weight 86.9 kg " (191 lb 9.6 oz)   BMI (Calculated) 31.95   Assessment Overweight   Goal weight 72.6 kg (160 lb)   Interventions Planned Attend group nutrition class   Psychosocial   Initial Patient Health Questionnaire -9 (PHQ-9) for depression. To notify physician if pre-score >9. 2   Initial [facility] Survey score. A quality of life measure. To re evaluate if total score is > 25. Also consider PHQ-9 and DASI to determine if patient should be referred back to physician. 22   Initial Shortness of Breath Questionnaire (SOBQ) score. The goal is to reduce the score pre to post program. 36   Intervention Planned Patient to identify 2-3 coping mechanisms to decrease stress and anxiety;Patient to attend appropriate education class;Use breathing techniques to control dyspnea cycle   Psychosocial Comments 7/5: Patient states she takes medication for depression which she feels is helpful.    Stages of Change   Aerobic Exercise Action   Physical Activity Action   Recommended diet Preparation   Stress Action   Smoking Cessation Maintenance   Oxygen Usage NA   Current Home Exercise   Type of Exercise Walking   Frequency (days per week) 3   Duration (minutes per session) 30   Recommended Home Exercise Prescription   Type of Exercise Walking;Calisthenics;LE Strengthening Exercise;Treadmill   Frequency (Days per week) 5-6   Duration (minutes per session) 15-30 min   Effort Rating Recommended (0-10) Scale  4-6/10   Recommended Exercise Heart Rate Range 20-30 over resting heart rate   30 Day Exercise Plan Patient to continue walking and increase overall exercise time.    Learning Assessment   Learner Patient   Primary Language English   Preferred Learning Style Listening;Demonstration   Barriers to Learning No barriers noted   Patient Education/Referrals   Education Recommended Activities of Daily Living;Air Quality;Airway Clearance;Anatomy and Disease Process;Breathing Techniques;Emotional Aspects of CLD;Energy Conservation;Exercise  Principles;Medication Overview;Home Oxygen Equipment;Nutrition   Education Attended Medication Overview   Follow-up/On-going Support   Provider follow-up needed on the following No follow-up needed   Pulmonary Rehab Goals   Pulmonary Rehab Goals 1;2;3   Goal 1   Goal Patient would like to get back to having a regular exercise routine that she can continue after SD.     Target Date 08/08/17   Progress Towards Goal Patient has started walking regularly outside of CR.    Goal 2   Goal Patient would like to be able to go up stairs without becoming short of breath.    Target Date 08/08/17   Goal 3   Goal Patient would like to improve her diet by learning healthier eating habits.    Target Date 08/08/17   Assessment   Assessment 7/5: Hortensia has attended 7 exercise sessions and 1 educational session so far in Pulmonary Rehab. Overall, she is doing fairly well. She has been attending regularly and is taking her own rehabilitation into her own hands and has been working on improving her overall health. She is advancing in her exercise modalities appropriately. Respiratory Therapy is recommended to help her reach her goals and help her to safely progress to increased levels of activity performance and decreased shortness of breath.

## 2017-07-06 ENCOUNTER — HOSPITAL ENCOUNTER (OUTPATIENT)
Dept: CARDIAC REHAB | Facility: HOSPITAL | Age: 62
Setting detail: THERAPIES SERIES
End: 2017-07-06
Attending: INTERNAL MEDICINE
Payer: COMMERCIAL

## 2017-07-06 VITALS — WEIGHT: 193 LBS | BODY MASS INDEX: 32.12 KG/M2

## 2017-07-06 PROCEDURE — G0424 PULMONARY REHAB W EXER: HCPCS

## 2017-07-11 ENCOUNTER — HOSPITAL ENCOUNTER (OUTPATIENT)
Dept: CARDIAC REHAB | Facility: HOSPITAL | Age: 62
Setting detail: THERAPIES SERIES
End: 2017-07-11
Attending: INTERNAL MEDICINE
Payer: COMMERCIAL

## 2017-07-11 VITALS — WEIGHT: 193 LBS | BODY MASS INDEX: 32.12 KG/M2

## 2017-07-11 PROCEDURE — G0424 PULMONARY REHAB W EXER: HCPCS

## 2017-07-13 ENCOUNTER — HOSPITAL ENCOUNTER (OUTPATIENT)
Dept: CARDIAC REHAB | Facility: HOSPITAL | Age: 62
Setting detail: THERAPIES SERIES
End: 2017-07-13
Attending: INTERNAL MEDICINE
Payer: COMMERCIAL

## 2017-07-13 VITALS — BODY MASS INDEX: 32.08 KG/M2 | WEIGHT: 192.8 LBS

## 2017-07-13 PROCEDURE — G0424 PULMONARY REHAB W EXER: HCPCS

## 2017-07-18 ENCOUNTER — HOSPITAL ENCOUNTER (OUTPATIENT)
Dept: CARDIAC REHAB | Facility: HOSPITAL | Age: 62
Setting detail: THERAPIES SERIES
End: 2017-07-18
Attending: INTERNAL MEDICINE
Payer: COMMERCIAL

## 2017-07-18 VITALS — BODY MASS INDEX: 31.95 KG/M2 | WEIGHT: 192 LBS

## 2017-07-18 PROCEDURE — G0424 PULMONARY REHAB W EXER: HCPCS

## 2017-07-20 ENCOUNTER — HOSPITAL ENCOUNTER (OUTPATIENT)
Dept: CARDIAC REHAB | Facility: HOSPITAL | Age: 62
Setting detail: THERAPIES SERIES
End: 2017-07-20
Attending: INTERNAL MEDICINE
Payer: COMMERCIAL

## 2017-07-20 VITALS — WEIGHT: 192.8 LBS | BODY MASS INDEX: 32.08 KG/M2

## 2017-07-20 PROCEDURE — G0424 PULMONARY REHAB W EXER: HCPCS

## 2017-07-25 ENCOUNTER — HOSPITAL ENCOUNTER (OUTPATIENT)
Dept: CARDIAC REHAB | Facility: HOSPITAL | Age: 62
Setting detail: THERAPIES SERIES
End: 2017-07-25
Attending: INTERNAL MEDICINE
Payer: COMMERCIAL

## 2017-07-25 VITALS — BODY MASS INDEX: 31.78 KG/M2 | WEIGHT: 191 LBS

## 2017-07-25 PROCEDURE — G0424 PULMONARY REHAB W EXER: HCPCS

## 2017-07-27 ENCOUNTER — HOSPITAL ENCOUNTER (OUTPATIENT)
Dept: CARDIAC REHAB | Facility: HOSPITAL | Age: 62
Setting detail: THERAPIES SERIES
End: 2017-07-27
Attending: INTERNAL MEDICINE
Payer: COMMERCIAL

## 2017-07-27 VITALS — BODY MASS INDEX: 31.62 KG/M2 | WEIGHT: 190 LBS

## 2017-07-27 PROCEDURE — G0424 PULMONARY REHAB W EXER: HCPCS

## 2017-08-01 ENCOUNTER — HOSPITAL ENCOUNTER (OUTPATIENT)
Dept: CARDIAC REHAB | Facility: HOSPITAL | Age: 62
Setting detail: THERAPIES SERIES
End: 2017-08-01
Attending: INTERNAL MEDICINE
Payer: COMMERCIAL

## 2017-08-01 PROCEDURE — G0424 PULMONARY REHAB W EXER: HCPCS

## 2017-08-03 ENCOUNTER — HOSPITAL ENCOUNTER (OUTPATIENT)
Dept: CARDIAC REHAB | Facility: HOSPITAL | Age: 62
Setting detail: THERAPIES SERIES
End: 2017-08-03
Attending: INTERNAL MEDICINE
Payer: COMMERCIAL

## 2017-08-03 VITALS — WEIGHT: 192 LBS | HEIGHT: 65 IN | BODY MASS INDEX: 31.99 KG/M2

## 2017-08-03 VITALS — BODY MASS INDEX: 32.12 KG/M2 | WEIGHT: 193 LBS

## 2017-08-03 PROCEDURE — G0424 PULMONARY REHAB W EXER: HCPCS

## 2017-08-03 ASSESSMENT — PULMONARY FUNCTION TESTS
FEV1: 1.55
FEV1/FVC: 38
FEV1 (%PREDICTED): 65
FVC_PERCENT_PREDICTED: 129
FVC: 4.12

## 2017-08-03 ASSESSMENT — 6 MINUTE WALK TEST (6MWT)
TOTAL DISTANCE WALKED: 342.9
MALE CALC: 465.93
GENDER SELECTION: FEMALE
FEMALE CALC: 445.59

## 2017-08-03 ASSESSMENT — DUKE ACTIVITY SCORE INDEX (DASI)
VO2_PEAK: 20.01
DASI METS SCORE: 5.72

## 2017-08-03 NOTE — PROGRESS NOTES
08/03/17 0700   Session   Session 60 Day Individualized Treatment Plan   Certified through this date 09/01/17   Type Reassessment   General Information   Treatment Diagnosis COPD   Classification of COPD Stage 2 (Moderate)   Onset Date 12/01/13   Hospital Location Not hospitalized   Current Signs and Symptoms None   Comments Patient does not have other co-morbidities.   Outpatient Pulmonary Rehab Start Date 06/08/17   Primary Physician Elsa Zuleta   Pulmonolgist Grant   General Information Comments Patient still states she's generally healthy   Medical History/Comorbidities   Medical History Comments Patient does not have any co-morbidities.   Untoward Events/Exacerbations/Hospitalizations   Untoward Events/Exacerbations/Hospitalizations None   Sputum   Sputum Production Amount small amounts   Sputum Production Color Clear   Sputum Production Consistency Thick   Tobacco History   Tobacco Former smoker   Tobacco Habit Cigarettes   Years Smoked 40   Average Packs Per Day 1   Quit Date or Planned Quit Date 12/01/13   Interventions Planned None: Patient is in maintenance   Medications   Long-Acting Beta Agonist Prescribed, taking as prescribed   Short-Acting Beta Agonist Prescribed, taking as prescribed   Long-Acting Anticholinergic Prescribed, taking as prescribed   Short-Acting Anticholinergic Not prescribed   Inhaled Corticosteroid Prescribed, taking as prescribed   Oral Corticosteroid Not prescribed   Medications Reconciled By Patient   Preventative Vaccinations   Influenza Vaccination Yes   Pneumonia Vaccination Yes   Pain   Patient Currently in Pain No   Falls Screen   Have you fallen two or more times in the past year? No   Have you fallen and had an injury in the past year? No   Comment Patient is not a fall risk nor is she afraid of falling   Living and Work Status   Living Arrangements house   Support System Live with an adult   Environmental Factors Pets   ADL Limitations None   Initial [facility]  Activity Status Index (DASI) score. A measure of functional capacity. The goal is to have a pre-program raw score of 9.95 (~4 METs) or above 24.2   Initial DASI VO2 Peak (ml*kg-1*min-1) 20.01   Initial DASI MET Level 5.72   Occupation Retired - was a    Return to Employment Retired   Physical Assessments   Incisions Not applicable   Edema None   Left Lung Sounds not assessed   Right Lung Sounds not assessed   Comments Patients physical assessments are WNL   Pulmonary Function Test (PFTs)   PFT Results Available   Date Completed 02/07/17   FVC Actual 4.12   % Predicted    FEV1 Actual 1.55   % Predicted FEV1 65   FEV1/FEV Ratio 38   Inspiratory Capacity (IC) Actual (L) 2.5   % Predicted    Uncorrected DLCO 12.08   % Predicted Uncorrected DLCO 49   Pre/Post Bronchodilator Post   Airway Obstruction Moderate   Individualized Treatment Plan   Sessions Scheduled 18   Sessions Attended 16   Type Aerobic exercise;Resistance training;Flexibility training   Oxygen Use   Supplemental Oxygen Needed No   Exercise Prescription   Mode Treadmill;Arm Ergometer/UBE;Rowing machine;Nustep   Frequency 2 days/week   Duration/Time 45-60 min   THR (85% of age predicted max heart rate)  132.6   Effort Rating (0-10) 4-6/10   Progression of Exercise Increase by .5 METs per week.    Oxygen Titration with Exercise NA   Comments Patient is doing well with aerobic activity   Exercise Assessment   6 Minute Walk Predicted - Gender Selection Female   6 Minute Walk Predicted (Female) 445.59   6 Minute Walk Predicted (Male) 465.93   6 Minute Walk Distance (Initial) 342.9 Meters   Resting HR 70   Exercise HR 94   Resting /68   Exercise /82   SpO2 95   Exercise SpO2 93   Current MET level 4.75   Exercise Tolerance good   Normal Limits Discussed Yes   Current Symptoms at Home Denies symptoms   Current Symptoms in Rehab None   Limitations Patient does not have physical limitations.    Nutrition Management   Age 62  "  Height 1.651 m (5' 5\")   Weight 87.1 kg (192 lb)   BMI (Calculated) 32.02   Assessment Overweight   Goal weight 72.6 kg (160 lb)   Interventions Planned Attend group nutrition class   Psychosocial   Initial Patient Health Questionnaire -9 (PHQ-9) for depression. To notify physician if pre-score >9. 2   Initial [facility] Survey score. A quality of life measure. To re evaluate if total score is > 25. Also consider PHQ-9 and DASI to determine if patient should be referred back to physician. 22   Initial Shortness of Breath Questionnaire (SOBQ) score. The goal is to reduce the score pre to post program. 36   Intervention Planned Patient to identify 2-3 coping mechanisms to decrease stress and anxiety;Patient to attend appropriate education class;Use breathing techniques to control dyspnea cycle   Psychosocial Comments 8/3/2017 Patient states she takes medication for depression which she feels is helpful   Stages of Change   Aerobic Exercise Action   Physical Activity Action   Recommended diet Preparation   Stress Action   Smoking Cessation Maintenance   Oxygen Usage NA   Current Home Exercise   Type of Exercise Walking   Frequency (days per week) 4   Duration (minutes per session) 30   Recommended Home Exercise Prescription   Type of Exercise Walking;Calisthenics;LE Strengthening Exercise;Treadmill   Frequency (Days per week) 5-6   Duration (minutes per session) 30-45 min   Effort Rating Recommended (0-10) Scale  4-6/10   Recommended Exercise Heart Rate Range 20 beats above resting   30 Day Exercise Plan Patient to continue walking and increasing exercise   Learning Assessment   Learner Patient   Primary Language English   Preferred Learning Style Listening;Demonstration   Barriers to Learning No barriers noted   Patient Education/Referrals   Education Recommended Activities of Daily Living;Air Quality;Airway Clearance;Anatomy and Disease Process;Breathing Techniques;Emotional Aspects of CLD;Energy " Conservation;Exercise Principles;Medication Overview;Home Oxygen Equipment;Nutrition   Education Attended Medication Overview   Follow-up/On-going Support   Provider follow-up needed on the following No follow-up needed   Pulmonary Rehab Goals   Pulmonary Rehab Goals 1;2;3   Goal 1   Goal Patient would like to get back to having a regular exercise routine that she can continue after [place].     Target Date 08/08/17   Date Met 08/03/17   Progress Towards Goal Patient has been exercising regularly outside of rehab   Goal 2   Goal Patient would like to be able to go up stairs without becoming short of breath.    Target Date 08/08/17   Goal 3   Goal Patient would like to improve her diet by learning healthier eating habits.    Target Date 08/08/17   Assessment   Assessment 8/3/3017  Hortensia is a patient who has completed 15 of her 18 exercise sessions and shaan has only attended one education session.  She does attend rehab regularly and [place].  Respiratory therapy is still recommended to help her safely reach her goals and help her to safely progress to increased levels of activity and control her SOB through utilizing breathing techniques correctly.   I have reviewed and agree with this patient s individual treatment plan and exercise prescription for pulmonary rehabilitation.  Please see  individual treatment plan for details of progress and plan.

## 2017-08-08 ENCOUNTER — HOSPITAL ENCOUNTER (OUTPATIENT)
Dept: CARDIAC REHAB | Facility: HOSPITAL | Age: 62
Setting detail: THERAPIES SERIES
End: 2017-08-08
Attending: INTERNAL MEDICINE
Payer: COMMERCIAL

## 2017-08-08 VITALS — WEIGHT: 192.7 LBS | BODY MASS INDEX: 32.07 KG/M2

## 2017-08-08 PROCEDURE — G0424 PULMONARY REHAB W EXER: HCPCS

## 2017-08-10 ENCOUNTER — HOSPITAL ENCOUNTER (OUTPATIENT)
Dept: CARDIAC REHAB | Facility: HOSPITAL | Age: 62
Setting detail: THERAPIES SERIES
End: 2017-08-10
Attending: INTERNAL MEDICINE
Payer: COMMERCIAL

## 2017-08-10 VITALS — BODY MASS INDEX: 32.15 KG/M2 | HEIGHT: 65 IN | WEIGHT: 193 LBS

## 2017-08-10 PROCEDURE — G0424 PULMONARY REHAB W EXER: HCPCS

## 2017-08-10 ASSESSMENT — 6 MINUTE WALK TEST (6MWT)
TOTAL DISTANCE WALKED: 361.5
FEMALE CALC: 444.55
GENDER SELECTION: FEMALE
MALE CALC: 465.13
TOTAL DISTANCE WALKED: 342.9

## 2017-08-10 ASSESSMENT — PULMONARY FUNCTION TESTS
FEV1: 1.55
FEV1 (%PREDICTED): 65
FEV1/FVC: 38
FVC_PERCENT_PREDICTED: 129
FVC: 4.12

## 2017-08-10 ASSESSMENT — DUKE ACTIVITY SCORE INDEX (DASI)
VO2_PEAK: 20.01
DASI METS SCORE: 5.72
TOTAL_SCORE: 42.7
VO2_PEAK: 27.96
DASI METS SCORE: 7.99

## 2017-08-10 NOTE — PROGRESS NOTES
08/10/17 0900   Session   Session Discharge Note   Type Discharge/Follow-up   General Information   Treatment Diagnosis COPD   Classification of COPD Stage 2 (Moderate)   Onset Date 12/01/13   Hospital Location Not hospitalized   Current Signs and Symptoms None   Comments Patient does not have other co-morbidities.   Outpatient Pulmonary Rehab Start Date 06/08/17   Primary Physician Elsa Zuleta   Pulmonolgist Grant   General Information Comments Patient still states she's generally healthy   Medical History/Comorbidities   Medical History Comments Patient does not have any co-morbidities.   Untoward Events/Exacerbations/Hospitalizations   Untoward Events/Exacerbations/Hospitalizations None   Sputum   Sputum Production Amount small amounts   Sputum Production Color Clear   Sputum Production Consistency Thick   Tobacco History   Tobacco Former smoker   Tobacco Habit Cigarettes   Years Smoked 40   Average Packs Per Day 1   Quit Date or Planned Quit Date 12/01/13   Interventions Planned None: Patient is in maintenance   Medications   Long-Acting Beta Agonist Prescribed, taking as prescribed   Short-Acting Beta Agonist Prescribed, taking as prescribed   Long-Acting Anticholinergic Prescribed, taking as prescribed   Short-Acting Anticholinergic Not prescribed   Inhaled Corticosteroid Prescribed, taking as prescribed   Oral Corticosteroid Not prescribed   Medications Reconciled By Patient   Preventative Vaccinations   Influenza Vaccination Yes   Pneumonia Vaccination Yes   Pain   Patient Currently in Pain No   Falls Screen   Have you fallen two or more times in the past year? No   Have you fallen and had an injury in the past year? No   Comment Patient is not a fall risk nor is she afraid of falling   Living and Work Status   Living Arrangements house   Support System Live with an adult   Environmental Factors Pets   ADL Limitations None   Initial [facility] Activity Status Index (DASI) score. A measure of functional  capacity. The goal is to have a pre-program raw score of 9.95 (~4 METs) or above 24.2   Initial DASI VO2 Peak (ml*kg-1*min-1) 20.01   Initial DASI MET Level 5.72   Discharge DASI score 42.7   Discharge DASI VO2 Peak 27.96   Discharge DASI MET Level 7.99   Occupation Retired - was a    Return to Employment Retired   Physical Assessments   Incisions Not applicable   Edema None   Left Lung Sounds not assessed   Right Lung Sounds not assessed   Comments Patients physical assessments are WNL   Pulmonary Function Test (PFTs)   PFT Results Available   Date Completed 02/07/17   FVC Actual 4.12   % Predicted    FEV1 Actual 1.55   % Predicted FEV1 65   FEV1/FEV Ratio 38   Inspiratory Capacity (IC) Actual (L) 2.5   % Predicted    Uncorrected DLCO 12.08   % Predicted Uncorrected DLCO 49   Pre/Post Bronchodilator Post   Airway Obstruction Moderate   Individualized Treatment Plan   Sessions Scheduled 18   Sessions Attended 18   Type Aerobic exercise;Resistance training;Flexibility training   Oxygen Use   Supplemental Oxygen Needed No   Interventions Recommended NA   Exercise Prescription   Mode Treadmill;Arm Ergometer/UBE;Rowing machine;Nustep   Frequency 2 days/week   Duration/Time 45-60 min   THR (85% of age predicted max heart rate)  132.6   Effort Rating (0-10) 4-6/10   Progression of Exercise Increase by .5 METs per week.    Oxygen Titration with Exercise NA   Comments Patient is doing well with aerobic activity   Exercise Assessment   6 Minute Walk Predicted - Gender Selection Female   6 Minute Walk Predicted (Female) 444.55   6 Minute Walk Predicted (Male) 465.13   6 Minute Walk Distance (Initial) 342.9 Meters   6-min Walk Distance (Discharge) 361.5 Meters   Resting HR 61   Exercise HR 90   Resting /82   Exercise /82   SpO2 97   Exercise SpO2 92   Current MET level 4.95   Exercise Tolerance good   Normal Limits Discussed Yes   Current Symptoms at Home Denies symptoms   Current Symptoms  "in Rehab None   Limitations Patient does not have physical limitations.    Nutrition Management   Age 62   Height 1.651 m (5' 5\")   Weight 87.5 kg (193 lb)   BMI (Calculated) 32.18   Assessment Overweight   Goal weight 72.6 kg (160 lb)   Interventions Planned Attend group nutrition class   Psychosocial   Initial Patient Health Questionnaire -9 (PHQ-9) for depression. To notify physician if pre-score >9. 2   Initial [employer] [facility] Survey score. A quality of life measure. To re evaluate if total score is > 25. Also consider PHQ-9 and DASI to determine if patient should be referred back to physician. 22   Initial Shortness of Breath Questionnaire (SOBQ) score. The goal is to reduce the score pre to post program. 36   Discharge PHQ-9 for depression 1   Discharge [employer] [facility] Survey Score 13   Discharge SOBQ Score 25   Intervention Planned Patient to identify 2-3 coping mechanisms to decrease stress and anxiety;Patient to attend appropriate education class;Use breathing techniques to control dyspnea cycle   Psychosocial Comments 8/10/2017 Patients PHQ9 score decreased from initial.  States she doesn't feel depressed   Stages of Change   Aerobic Exercise Action   Physical Activity Action   Recommended diet Preparation   Stress Action   Smoking Cessation Maintenance   Oxygen Usage NA   Current Home Exercise   Type of Exercise Walking   Frequency (days per week) 7   Duration (minutes per session) 30   Recommended Home Exercise Prescription   Type of Exercise Walking;Calisthenics;LE Strengthening Exercise;Treadmill   Frequency (Days per week) 5-6   Duration (minutes per session) 30-45 min   Effort Rating Recommended (0-10) Scale  4-6/10   Recommended Exercise Heart Rate Range 20 beats above resting   30 Day Exercise Plan Patient to continue walking and increasing exercise   Learning Assessment   Learner Patient   Primary Language English   Preferred Learning Style Listening;Demonstration   Barriers to Learning No barriers " noted   Patient Education/Referrals   Education Recommended Activities of Daily Living;Air Quality;Airway Clearance;Anatomy and Disease Process;Breathing Techniques;Emotional Aspects of CLD;Energy Conservation;Exercise Principles;Medication Overview;Home Oxygen Equipment;Nutrition   Education Attended Medication Overview   Referral(s) Recommended None   Follow-up/On-going Support   Provider follow-up needed on the following No follow-up needed   Comments 8/10/2017 Patient states she has a follow up with Rudy in May of 2018 and with her primary in December   Pulmonary Rehab Goals   Pulmonary Rehab Goals 1;2;3   Goal 1   Goal Patient would like to get back to having a regular exercise routine that she can continue after [place].     Target Date 08/08/17   Date Met 08/03/17   Progress Towards Goal Patient has been exercising regularly outside of Rehab.  She walks her dog daily for one half hour.   Goal 2   Goal Patient would like to be able to go up stairs without becoming short of breath.    Target Date 11/08/17   Progress Towards Goal Patient states breathing is better but still difficult.  She states breathing techniques help.   Goal 3   Goal Patient would like to improve her diet by learning healthier eating habits.    Target Date 11/10/17   Progress Towards Goal Patient states summer is difficult with going to cabin and spending time with family.   Assessment   Assessment 8/10/2017 Hortensia is a patient who has completed 18 of 18 exercise sessions along with only 1 education session.  She did attend rehab regularly.  SHe will continue to walk for 30 minutes twice a day at home.  She plans on walking outside when it's warm and on the treadmill.    Handouts were given on aerobic exercising, weights and stretches, muscle conditioning, and 100 calorie snacks.  She has a good technique with breathing exercises without encouragement.  Patient encouraged to call with questions,  Overall patient did well with Pulmonary  Rehab.  She improved on all of her survey scores, and increased her 6MWT by 61 feet.   I have reviewed this patient s outcomes and self report of symptoms.  The patient has completed Pulmonary Rehabilitation and has made appropriate progress towards goals.  Please see individual treatment plan for details of attainment, discharge plan and independent exercise prescription.

## 2017-09-14 DIAGNOSIS — J43.1 PANLOBULAR EMPHYSEMA (H): ICD-10-CM

## 2017-10-05 ENCOUNTER — HOSPITAL ENCOUNTER (EMERGENCY)
Facility: HOSPITAL | Age: 62
Discharge: HOME | End: 2017-10-05
Attending: PHYSICIAN ASSISTANT | Admitting: PHYSICIAN ASSISTANT
Payer: COMMERCIAL

## 2017-10-05 ENCOUNTER — APPOINTMENT (OUTPATIENT)
Dept: GENERAL RADIOLOGY | Facility: HOSPITAL | Age: 62
End: 2017-10-05
Attending: PHYSICIAN ASSISTANT
Payer: COMMERCIAL

## 2017-10-05 VITALS
SYSTOLIC BLOOD PRESSURE: 156 MMHG | TEMPERATURE: 98.9 F | RESPIRATION RATE: 16 BRPM | OXYGEN SATURATION: 94 % | DIASTOLIC BLOOD PRESSURE: 75 MMHG

## 2017-10-05 DIAGNOSIS — S86.912A KNEE STRAIN, LEFT, INITIAL ENCOUNTER: ICD-10-CM

## 2017-10-05 PROCEDURE — 99213 OFFICE O/P EST LOW 20 MIN: CPT

## 2017-10-05 PROCEDURE — 73562 X-RAY EXAM OF KNEE 3: CPT | Mod: TC,LT

## 2017-10-05 PROCEDURE — 99213 OFFICE O/P EST LOW 20 MIN: CPT | Performed by: PHYSICIAN ASSISTANT

## 2017-10-05 ASSESSMENT — ENCOUNTER SYMPTOMS
CARDIOVASCULAR NEGATIVE: 1
PSYCHIATRIC NEGATIVE: 1
CONSTITUTIONAL NEGATIVE: 1
NECK STIFFNESS: 0
NECK PAIN: 0
MYALGIAS: 1

## 2017-10-05 NOTE — ED NOTES
Patient presents with sore Lt knee.  Patient was on a treadmill and it got away on her on Monday.  Advil take and IBU taken.

## 2017-10-05 NOTE — ED PROVIDER NOTES
"  History     Chief Complaint   Patient presents with     Leg Pain     c/o lt knee and lower leg pain, notes the treadmill got \"away from her on an incline\". c/o heard a \"pop and clicking\"     The history is provided by the patient. No  was used.     Latrice Henao is a 62 year old female who has left knee pain for 3 days. States it mainly when she is walking and in the back of her knee. States it started after she over extended it it while on the treadmill. States the treadmill  \"got away from me.\"    States she heard a \"pop\" and some clicking. States the pain is worse at times, better right now.  States she is done with the treadmill!  Denies any other injury at this time.    I have reviewed the Medications, Allergies, Past Medical and Surgical History, and Social History in the Epic system.    Allergies: No Known Allergies      No current facility-administered medications on file prior to encounter.   Current Outpatient Prescriptions on File Prior to Encounter:  BREO ELLIPTA 100-25 MCG/INH oral inhaler Inhale 1 puff into the lungs daily   albuterol (ALBUTEROL) 108 (90 BASE) MCG/ACT Inhaler Inhale 2 puffs into the lungs every 6 hours as needed   escitalopram (LEXAPRO) 20 MG tablet Take 1 tablet (20 mg) by mouth daily   lisinopril (PRINIVIL/ZESTRIL) 5 MG tablet Take 1 tablet (5 mg) by mouth daily   LORazepam (ATIVAN) 0.5 MG tablet Take 1 tablet (0.5 mg) by mouth every 8 hours as needed for anxiety   tiotropium (SPIRIVA HANDIHALER) 18 MCG capsule INHALE CONTENTS OF 1 CAPSULE DAILY   loratadine 10 MG capsule Take 10 mg by mouth daily       Patient Active Problem List   Diagnosis     COPD (chronic obstructive pulmonary disease) ([facility])     Hypertension     ACP (advance care planning)     Elevated cholesterol     Diastolic dysfunction       Past Surgical History:   Procedure Laterality Date     BLEPHAROPLASTY, BROW LIFT BILATERAL, COMBINED Bilateral 6/2/2016    Procedure: COMBINED BLEPHAROPLASTY, " "BROW LIFT BILATERAL;  Surgeon: David Vitale MD;  Location: [facility] OR       Social History   Substance Use Topics     Smoking status: Former Smoker     Packs/day: 0.50     Years: 40.00     Types: Cigarettes     Quit date: 12/28/2012     Smokeless tobacco: Not on file      Comment: [employer]: current every day smoker - passive smoke exposure     Alcohol use No       Most Recent Immunizations   Administered Date(s) Administered     Influenza Vaccine IM 3yrs+ 4 Valent IIV4 10/18/2016     Pneumococcal 23 valent 03/20/2017     TDAP Vaccine (Boostrix) 06/08/2015       BMI: Estimated body mass index is 32.12 kg/(m^2) as calculated from the following:    Height as of 8/10/17: 1.651 m (5' 5\").    Weight as of 8/10/17: 87.5 kg (193 lb).      Review of Systems   Constitutional: Negative.    HENT: Negative.    Cardiovascular: Negative.    Musculoskeletal: Positive for gait problem and myalgias. Negative for neck pain and neck stiffness.   Psychiatric/Behavioral: Negative.        Physical Exam   BP: 156/75  Heart Rate: 92  Temp: 98.9  F (37.2  C)  Resp: 16  SpO2: 94 %  Physical Exam   Constitutional: She is oriented to person, place, and time. She appears well-developed and well-nourished. No distress.   Cardiovascular: Normal rate.    Pulmonary/Chest: Effort normal.   Musculoskeletal:   Left knee: minor diffuse edema. No erythema/ecchymosis. Neg v/v/d. No joint line TTP.  Pt states her knee is feeling better right now.   Neurological: She is alert and oriented to person, place, and time.   Skin: She is not diaphoretic.   Psychiatric: She has a normal mood and affect.   Nursing note and vitals reviewed.      ED Course     ED Course     Procedures      Left knee xray: no acute bony process noted,  Pending rad results    Ace wrap applied.  Assessments & Plan (with Medical Decision Making)     I have reviewed the nursing notes.    I have reviewed the findings, diagnosis, plan and need for follow up with the patient.    Final " diagnoses:   Knee strain, left, initial encounter         Pt would like second opinion with an Orthopedic Consultation.  Orthopedic consult placed.  Wear ace wrap for comfort and support.  Patient verbally educated and given appropriate education sheets for the diagnoses and has no questions.  Take current medications as directed.   Follow up with your Primary Care provider if symptoms increase prior to your Orthopedic evaluation.   if concerns develop, return to the ER  Gina Ortiz Certified  Physician Assistant  10/5/2017  7:46 PM  UR[facility]ENT CARE CL[facility]IC      10/5/2017   [facility]     Gina Ortiz PA  10/05/17 1948

## 2017-10-05 NOTE — ED AVS SNAPSHOT
[facility]    [address] [address]    Phone:  [phone]                                       Latrice Henao   MRN: [id number]    Department:  [facility]   Date of Visit:  10/5/2017           After Visit Summary Signature Page     I have received my discharge instructions, and my questions have been answered. I have discussed any challenges I see with this plan with the nurse or doctor.    ..........................................................................................................................................  Patient/Patient Representative Signature      ..........................................................................................................................................  Patient Representative Print Name and Relationship to Patient    ..................................................               ................................................  Date                                            Time    ..........................................................................................................................................  Reviewed by Signature/Title    ...................................................              ..............................................  Date                                                            Time

## 2017-10-05 NOTE — ED AVS SNAPSHOT
[facility]    [address]    [place] [address]    Phone:  [phone]                                       Latrice Henao   MRN: [id number]    Department:  [facility]   Date of Visit:  10/5/2017           Patient Information     Date Of Birth          1955        Your diagnoses for this visit were:     Knee strain, left, initial encounter        You were seen by Gina Ortiz PA.      Follow-up Information     Follow up with Elsa Medrano PA.    Specialty:  Family Practice    Why:  If symptoms worsen prior to your Consult appt with Orthopedics.    Contact information:    [facility]  [address]  [place] [address] [phone]          Follow up with [facility].    Specialty:  EMERGENCY MEDICINE    Why:  If further concerns develop    Contact information:    [address]  [place] [address] [phone]    Additional information:    From [facility]: Take [address]. Turn left at [address]. Turn left at the first stoplight on [address]. At the first stop sign, take a right onto [address]. Take a left into the parking lot and continue through until you reach the North enterance of the building.       From [place]: Take [address]. Take the [address] ramp towards [place]. Turn left onto [address]. Take a slight right onto [address]. Turn left at the first stoplight on [address]. At the first stop sign, take a right onto [address]. Take a left into the parking lot and continue through until you reach the North enterance of the building.       From [place]: Take [address]. Take a right at [address]. At the first stop sign, take a right onto [address]. Take a left into the parking lot and continue through until you reach the North enterance of the building.       Discharge References/Attachments     ACE WRAP  (ENGLISH)    KNEE SPRAIN (ENGLISH)      Future Appointments        Provider Department Dept Phone Center    10/11/2017 8:15 AM JUSTIN [facility] [phone] [facility]      ED Discharge Orders     ORTHOPEDICS ADULT REFERRAL       Your provider has referred you to: {ORTHOPEDICS ADULT REGIONS:    Please be aware that coverage of these services is subject to the terms and limitations of your health insurance plan.  Call member services at your health plan with any benefit or coverage questions.      Please bring the following to your appointment:    >>   Any x-rays, CTs or MRIs which have been performed.  Contact the facility where they were done to arrange for  prior to your scheduled appointment.    >>   List of current medications   >>   This referral request   >>   Any documents/labs given to you for this referral                     Review of your medicines      Our records show that you are taking the medicines listed below. If these are incorrect, please call your family doctor or clinic.        Dose / Directions Last dose taken    albuterol 108 (90 BASE) MCG/ACT Inhaler   Commonly known as:  PROAIR HFA   Dose:  2 puff   Quantity:  3 Inhaler        Inhale 2 puffs into the lungs every 6 hours as needed   Refills:  4        BREO ELLIPTA 100-25 MCG/INH oral inhaler   Quantity:  1 Inhaler   Generic drug:  fluticasone-vilanterol        Inhale 1 puff into the lungs daily   Refills:  1        escitalopram 20 MG tablet   Commonly known as:  LEXAPRO   Dose:  20 mg   Quantity:  90 tablet        Take 1 tablet (20 mg) by mouth daily   Refills:  3        lisinopril 5 MG tablet   Commonly known as:  PRINIVIL/ZESTRIL   Dose:  5 mg   Quantity:  90 tablet        Take 1 tablet (5 mg) by mouth daily   Refills:  1        loratadine 10 MG capsule   Dose:  10 mg   Quantity:  30 capsule        Take 10 mg by mouth daily   Refills:  11        LORazepam 0.5 MG tablet   Commonly known as:  ATIVAN    Dose:  0.5 mg   Quantity:  10 tablet        Take 1 tablet (0.5 mg) by mouth every 8 hours as needed for anxiety   Refills:  0        tiotropium 18 MCG capsule   Commonly known as:  SPIRIVA HANDIHALER   Quantity:  30 capsule        INHALE CONTENTS OF 1 CAPSULE DAILY   Refills:  11                Procedures and tests performed during your visit     Knee XR, 3 views, left      Orders Needing Specimen Collection     None      Pending Results     Date and Time Order Name Status Description    10/5/2017 1829 Knee XR, 3 views, left In process             Pending Culture Results     No orders found from 10/3/2017 to 10/6/2017.            Thank you for choosing [facility]       Thank you for choosing [facility] for your care. Our goal is always to provide you with excellent care. Hearing back from our patients is one way we can continue to improve our services. Please take a few minutes to complete the written survey that you may receive in the mail after you visit with us. Thank you!        [employer] Information     B[employer] gives you secure access to your electronic health record. If you see a primary care provider, you can also send messages to your care team and make appointments. If you have questions, please call your primary care clinic.  If you do not have a primary care provider, please call [phone] and they will assist you.        Care EveryWhere ID     This is your Care EveryWhere ID. This could be used by other organizations to access your [facility] medical records  [id number]        Equal Access to Services     MISSY FROST AH: Hadii ledy Ch, waaxda luqadaha, qaybta kaalmada destinee, balwinder hair. So [facility] [phone].    ATENCIÓN: Si habla español, tiene a rock disposición servicios gratuitos de asistencia lingüística. Llame al [phone].    We comply with applicable federal civil rights laws and [place] laws. We do not discriminate on the basis of race, color,  national origin, age, disability, sex, sexual orientation, or gender identity.            After Visit Summary       This is your record. Keep this with you and show to your community pharmacist(s) and doctor(s) at your next visit.

## 2017-10-06 ENCOUNTER — TRANSFERRED RECORDS (OUTPATIENT)
Dept: HEALTH INFORMATION MANAGEMENT | Facility: HOSPITAL | Age: 62
End: 2017-10-06

## 2017-10-06 ENCOUNTER — MEDICAL CORRESPONDENCE (OUTPATIENT)
Dept: HEALTH INFORMATION MANAGEMENT | Facility: HOSPITAL | Age: 62
End: 2017-10-06

## 2017-10-16 ENCOUNTER — ALLIED HEALTH/NURSE VISIT (OUTPATIENT)
Dept: FAMILY MEDICINE | Facility: OTHER | Age: 62
End: 2017-10-16
Attending: PHYSICIAN ASSISTANT
Payer: COMMERCIAL

## 2017-10-16 DIAGNOSIS — Z23 NEED FOR PROPHYLACTIC VACCINATION AND INOCULATION AGAINST INFLUENZA: Primary | ICD-10-CM

## 2017-10-16 PROCEDURE — 90686 IIV4 VACC NO PRSV 0.5 ML IM: CPT

## 2017-10-16 PROCEDURE — 90471 IMMUNIZATION ADMIN: CPT

## 2017-10-16 NOTE — PROGRESS NOTES
Injectable Influenza Immunization Documentation    1.  Is the person to be vaccinated sick today?   No    2. Does the person to be vaccinated have an allergy to a component   of the vaccine?   No    3. Has the person to be vaccinated ever had a serious reaction   to influenza vaccine in the past?   No    4. Has the person to be vaccinated ever had Guillain-Barré syndrome?   No    Form completed by AZRA LONDON

## 2017-10-16 NOTE — MR AVS SNAPSHOT
After Visit Summary   10/16/2017    Latrice Henao    MRN: [id number]           Patient Information     Date Of Birth          1955        Visit Information        Provider Department      10/16/2017 9:20 AM HC FLU SHOT CLINIC [facility] [place]        Today's Diagnoses     Need for prophylactic vaccination and inoculation against influenza    -  1       Follow-ups after your visit        Your next 10 appointments already scheduled     Oct 16, 2017  9:20 AM CDT   (Arrive by 9:05 AM)   Flu Shot with HC FLU SHOT CLINIC   [facility] [place] ([facility] [place] )    [address]  [place] [address]   [phone]              Who to contact     If you have questions or need follow up information about today's clinic visit or your schedule please contact [facility] directly at [phone].  Normal or non-critical lab and imaging results will be communicated to you by Power[employer] System[employer]art, letter or phone within 4 business days after the clinic has received the results. If you do not hear from us within 7 days, please contact the clinic through [employer] Systemshart or phone. If you have a critical or abnormal lab result, we will notify you by phone as soon as possible.  Submit refill requests through Hungrio or call your pharmacy and they will forward the refill request to us. Please allow 3 business days for your refill to be completed.          Additional Information About Your Visit        MyChart Information     [employer] gives you secure access to your electronic health record. If you see a primary care provider, you can also send messages to your care team and make appointments. If you have questions, please call your primary care clinic.  If you do not have a primary care provider, please call [phone] and they will assist you.        Care EveryWhere ID     This is your Care EveryWhere ID. This could be used by other organizations to access your [facility]  records  [id number]         Blood Pressure from Last 3 Encounters:   10/05/17 156/75   06/14/17 120/76   03/20/17 142/82    Weight from Last 3 Encounters:   08/10/17 193 lb (87.5 kg)   08/08/17 192 lb 11.2 oz (87.4 kg)   08/03/17 193 lb (87.5 kg)              We Performed the Following     HC FLU VAC PRESRV FREE QUAD SPLIT VIR 3+YRS IM     Vaccine Administration, Initial [75729]        Primary Care Provider Office Phone # Fax #    Elsa JUSTIN Bains [phone]       [facility] [address]  [place] [address]        Equal Access to Services     MISSY FROST : Hadii aad ku hadasho Soomaali, waaxda luqadaha, qaybta kaalmada adeegyada, waxlawson fossin haychrissien elena sanders . So [facility] [phone].    ATENCIÓN: Si habla español, tiene a rock disposición servicios gratuitos de asistencia lingüística. Llame al [phone].    We comply with applicable federal civil rights laws and [place] laws. We do not discriminate on the basis of race, color, national origin, age, disability, sex, sexual orientation, or gender identity.            Thank you!     Thank you for choosing [facility] [place]  for your care. Our goal is always to provide you with excellent care. Hearing back from our patients is one way we can continue to improve our services. Please take a few minutes to complete the written survey that you may receive in the mail after your visit with us. Thank you!             Your Updated Medication List - Protect others around you: Learn how to safely use, store and throw away your medicines at www.disposemymeds.org.          This list is accurate as of: 10/16/17  9:15 AM.  Always use your most recent med list.                   Brand Name Dispense Instructions for use Diagnosis    albuterol 108 (90 BASE) MCG/ACT Inhaler    PROAIR HFA    3 Inhaler    Inhale 2 puffs into the lungs every 6 hours as needed    Panlobular emphysema (H)       BREO ELLIPTA 100-25 MCG/INH oral  inhaler   Generic drug:  fluticasone-vilanterol     1 Inhaler    Inhale 1 puff into the lungs daily    Panlobular emphysema (H)       escitalopram 20 MG tablet    LEXAPRO    90 tablet    Take 1 tablet (20 mg) by mouth daily    AMY (generalized anxiety disorder)       lisinopril 5 MG tablet    PRINIVIL/ZESTRIL    90 tablet    Take 1 tablet (5 mg) by mouth daily    Diastolic dysfunction       loratadine 10 MG capsule     30 capsule    Take 10 mg by mouth daily    Seasonal allergic rhinitis       LORazepam 0.5 MG tablet    ATIVAN    10 tablet    Take 1 tablet (0.5 mg) by mouth every 8 hours as needed for anxiety    AMY (generalized anxiety disorder)       tiotropium 18 MCG capsule    SPIRIVA HANDIHALER    30 capsule    INHALE CONTENTS OF 1 CAPSULE DAILY    Panlobular emphysema (H)

## 2017-11-01 ENCOUNTER — TELEPHONE (OUTPATIENT)
Dept: FAMILY MEDICINE | Facility: OTHER | Age: 62
End: 2017-11-01

## 2017-11-01 ENCOUNTER — OFFICE VISIT (OUTPATIENT)
Dept: FAMILY MEDICINE | Facility: OTHER | Age: 62
End: 2017-11-01
Attending: PHYSICIAN ASSISTANT
Payer: COMMERCIAL

## 2017-11-01 VITALS
OXYGEN SATURATION: 96 % | HEART RATE: 78 BPM | WEIGHT: 191 LBS | BODY MASS INDEX: 32.61 KG/M2 | HEIGHT: 64 IN | DIASTOLIC BLOOD PRESSURE: 72 MMHG | SYSTOLIC BLOOD PRESSURE: 134 MMHG | TEMPERATURE: 97.7 F

## 2017-11-01 DIAGNOSIS — Z01.818 PREOP GENERAL PHYSICAL EXAM: Primary | ICD-10-CM

## 2017-11-01 LAB
BASOPHILS # BLD AUTO: 0.1 10E9/L (ref 0–0.2)
BASOPHILS NFR BLD AUTO: 1.7 %
DIFFERENTIAL METHOD BLD: NORMAL
EOSINOPHIL # BLD AUTO: 0.2 10E9/L (ref 0–0.7)
EOSINOPHIL NFR BLD AUTO: 4.8 %
ERYTHROCYTE [DISTWIDTH] IN BLOOD BY AUTOMATED COUNT: 12.8 % (ref 10–15)
HCT VFR BLD AUTO: 40.1 % (ref 35–47)
HGB BLD-MCNC: 13.4 G/DL (ref 11.7–15.7)
IMM GRANULOCYTES # BLD: 0 10E9/L (ref 0–0.4)
IMM GRANULOCYTES NFR BLD: 0.2 %
LYMPHOCYTES # BLD AUTO: 1.3 10E9/L (ref 0.8–5.3)
LYMPHOCYTES NFR BLD AUTO: 30.7 %
MCH RBC QN AUTO: 29.3 PG (ref 26.5–33)
MCHC RBC AUTO-ENTMCNC: 33.4 G/DL (ref 31.5–36.5)
MCV RBC AUTO: 88 FL (ref 78–100)
MONOCYTES # BLD AUTO: 0.4 10E9/L (ref 0–1.3)
MONOCYTES NFR BLD AUTO: 9.1 %
NEUTROPHILS # BLD AUTO: 2.2 10E9/L (ref 1.6–8.3)
NEUTROPHILS NFR BLD AUTO: 53.5 %
NRBC # BLD AUTO: 0 10*3/UL
NRBC BLD AUTO-RTO: 0 /100
PLATELET # BLD AUTO: 256 10E9/L (ref 150–450)
RBC # BLD AUTO: 4.58 10E12/L (ref 3.8–5.2)
WBC # BLD AUTO: 4.2 10E9/L (ref 4–11)

## 2017-11-01 PROCEDURE — 36415 COLL VENOUS BLD VENIPUNCTURE: CPT | Performed by: PHYSICIAN ASSISTANT

## 2017-11-01 PROCEDURE — 99214 OFFICE O/P EST MOD 30 MIN: CPT | Mod: 25 | Performed by: PHYSICIAN ASSISTANT

## 2017-11-01 PROCEDURE — 85025 COMPLETE CBC W/AUTO DIFF WBC: CPT | Performed by: PHYSICIAN ASSISTANT

## 2017-11-01 PROCEDURE — 93000 ELECTROCARDIOGRAM COMPLETE: CPT | Performed by: INTERNAL MEDICINE

## 2017-11-01 ASSESSMENT — PAIN SCALES - GENERAL: PAINLEVEL: MILD PAIN (2)

## 2017-11-01 ASSESSMENT — ANXIETY QUESTIONNAIRES
IF YOU CHECKED OFF ANY PROBLEMS ON THIS QUESTIONNAIRE, HOW DIFFICULT HAVE THESE PROBLEMS MADE IT FOR YOU TO DO YOUR WORK, TAKE CARE OF THINGS AT HOME, OR GET ALONG WITH OTHER PEOPLE: NOT DIFFICULT AT ALL
GAD7 TOTAL SCORE: 1
3. WORRYING TOO MUCH ABOUT DIFFERENT THINGS: SEVERAL DAYS
7. FEELING AFRAID AS IF SOMETHING AWFUL MIGHT HAPPEN: NOT AT ALL
6. BECOMING EASILY ANNOYED OR IRRITABLE: NOT AT ALL
5. BEING SO RESTLESS THAT IT IS HARD TO SIT STILL: NOT AT ALL
1. FEELING NERVOUS, ANXIOUS, OR ON EDGE: NOT AT ALL
2. NOT BEING ABLE TO STOP OR CONTROL WORRYING: NOT AT ALL

## 2017-11-01 ASSESSMENT — PATIENT HEALTH QUESTIONNAIRE - PHQ9
5. POOR APPETITE OR OVEREATING: NOT AT ALL
SUM OF ALL RESPONSES TO PHQ QUESTIONS 1-9: 1

## 2017-11-01 NOTE — NURSING NOTE
"Chief Complaint   Patient presents with     Pre-Op Exam     for Dr. Serrano at [facility] on 11/16/17 for Left knee surgery        Initial /72 (BP Location: Left arm, Patient Position: Chair, Cuff Size: Adult Large)  Pulse 78  Temp 97.7  F (36.5  C) (Tympanic)  Ht 5' 3.75\" (1.619 m)  Wt 191 lb (86.6 kg)  SpO2 96%  Breastfeeding? No  BMI 33.04 kg/m2 Estimated body mass index is 33.04 kg/(m^2) as calculated from the following:    Height as of this encounter: 5' 3.75\" (1.619 m).    Weight as of this encounter: 191 lb (86.6 kg).  Medication Reconciliation: complete   Loraine Chua CMA(AAMA)   "

## 2017-11-01 NOTE — MR AVS SNAPSHOT
After Visit Summary   11/1/2017    Latrice Henao    MRN: [id number]           Patient Information     Date Of Birth          1955        Visit Information        Provider Department      11/1/2017 9:30 AM Elsa Medrano PA [facility]        Today's Diagnoses     Preop general physical exam    -  1      Care Instructions      Before Your Surgery      Call your surgeon if there is any change in your health. This includes signs of a cold or flu (such as a sore throat, runny nose, cough, rash or fever).    Do not smoke, drink alcohol or take over the counter medicine (unless your surgeon or primary care doctor tells you to) for the 24 hours before and after surgery.    If you take prescribed drugs: Follow your doctor s orders about which medicines to take and which to stop until after surgery.    Eating and drinking prior to surgery: follow the instructions from your surgeon    Take a shower or bath the night before surgery. Use the soap your surgeon gave you to gently clean your skin. If you do not have soap from your surgeon, use your regular soap. Do not shave or scrub the surgery site.  Wear clean pajamas and have clean sheets on your bed.           Follow-ups after your visit        Who to contact     If you have questions or need follow up information about today's clinic visit or your schedule please contact [facility] directly at [phone].  Normal or non-critical lab and imaging results will be communicated to you by MyChart, letter or phone within 4 business days after the clinic has received the results. If you do not hear from us within 7 days, please contact the clinic through MyChart or phone. If you have a critical or abnormal lab result, we will notify you by phone as soon as possible.  Submit refill requests through Dreampod or call your pharmacy and they will forward the refill request to us. Please allow 3 business days for your refill to be  "completed.          Additional Information About Your Visit        MyChart Information     [employer] gives you secure access to your electronic health record. If you see a primary care provider, you can also send messages to your care team and make appointments. If you have questions, please call your primary care clinic.  If you do not have a primary care provider, please call [phone] and they will assist you.        Care EveryWhere ID     This is your Care EveryWhere ID. This could be used by other organizations to access your [facility] medical records  [id number]        Your Vitals Were     Pulse Temperature Height Pulse Oximetry Breastfeeding? BMI (Body Mass Index)    78 97.7  F (36.5  C) (Tympanic) 5' 3.75\" (1.619 m) 96% No 33.04 kg/m2       Blood Pressure from Last 3 Encounters:   11/01/17 134/72   10/05/17 156/75   06/14/17 120/76    Weight from Last 3 Encounters:   11/01/17 191 lb (86.6 kg)   08/10/17 193 lb (87.5 kg)   08/08/17 192 lb 11.2 oz (87.4 kg)              We Performed the Following     CBC with platelets and differential     EKG 12-lead complete w/read - (Clinic Performed)        Primary Care Provider Office Phone # Fax #    JUSTIN Bansal [phone]       [facility] [address] [address]        Equal Access to Services     [facility] CenterELISABETH : Hadii aad ku hadasho Soomaali, waaxda luqadaha, qaybta kaalmada adeegyada, balwinder tucker haymauri sanders . So [facility] [phone].    ATENCIÓN: Si habla español, tiene a rock disposición servicios gratuitos de asistencia lingüística. Llame al [phone].    We comply with applicable federal civil rights laws and [place] laws. We do not discriminate on the basis of race, color, national origin, age, disability, sex, sexual orientation, or gender identity.            Thank you!     Thank you for choosing [facility]  for your care. Our goal is always to provide you with excellent " care. Hearing back from our patients is one way we can continue to improve our services. Please take a few minutes to complete the written survey that you may receive in the mail after your visit with us. Thank you!             Your Updated Medication List - Protect others around you: Learn how to safely use, store and throw away your medicines at www.disposemymeds.org.          This list is accurate as of: 11/1/17 10:16 AM.  Always use your most recent med list.                   Brand Name Dispense Instructions for use Diagnosis    albuterol 108 (90 BASE) MCG/ACT Inhaler    PROAIR HFA    3 Inhaler    Inhale 2 puffs into the lungs every 6 hours as needed    Panlobular emphysema (H)       BREO ELLIPTA 100-25 MCG/INH oral inhaler   Generic drug:  fluticasone-vilanterol     1 Inhaler    Inhale 1 puff into the lungs daily    Panlobular emphysema (H)       escitalopram 20 MG tablet    LEXAPRO    90 tablet    Take 1 tablet (20 mg) by mouth daily    AMY (generalized anxiety disorder)       lisinopril 5 MG tablet    PRINIVIL/ZESTRIL    90 tablet    Take 1 tablet (5 mg) by mouth daily    Diastolic dysfunction       loratadine 10 MG capsule     30 capsule    Take 10 mg by mouth daily    Seasonal allergic rhinitis       LORazepam 0.5 MG tablet    ATIVAN    10 tablet    Take 1 tablet (0.5 mg) by mouth every 8 hours as needed for anxiety    AMY (generalized anxiety disorder)       tiotropium 18 MCG capsule    SPIRIVA HANDIHALER    30 capsule    INHALE CONTENTS OF 1 CAPSULE DAILY    Panlobular emphysema (H)

## 2017-11-01 NOTE — PROGRESS NOTES
[facility] [place]  [address]  [place] [address]  [phone]  Dept: [phone]    PRE-OP EVALUATION:  Today's date: 2017    Latrice Henao (: 1955) presents for pre-operative evaluation assessment as requested by  .  She requires evaluation and anesthesia risk assessment prior to undergoing surgery/procedure for treatment of left knee surgery  .  Proposed procedure: left knee surgery     Date of Surgery/ Procedure: 17  Time of Surgery/ Procedure: [facility]   Hospital/Surgical Facility: [facility]   Fax number for surgical facility:   Primary Physician: Elsa Medrano  Type of Anesthesia Anticipated: to be determined    Patient has a Health Care Directive or Living Will:  NO    1. NO - Do you have a history of heart attack, stroke, stent, bypass or surgery on an artery in the head, neck, heart or legs?  2. NO - Do you ever have any pain or discomfort in your chest?  3. NO - Do you have a history of  Heart Failure? COPD  4. YES - Are you troubled by shortness of breath when: walking on the level, up a slight hill or at night?  5. NO - Do you currently have a cold, bronchitis or other respiratory infection?  6. NO - Do you have a cough, shortness of breath or wheezing?  7. NO - Do you sometimes get pains in the calves of your legs when you walk?  8. NO - Do you or anyone in your family have previous history of blood clots?  9. NO - Do you or does anyone in your family have a serious bleeding problem such as prolonged bleeding following surgeries or cuts?  10. NO - Have you ever had problems with anemia or been told to take iron pills?  11. NO - Have you had any abnormal blood loss such as black, tarry or bloody stools, or abnormal vaginal bleeding?  12. NO - Have you ever had a blood transfusion?  13. NO - Have you or any of your relatives ever had problems with anesthesia?  14. NO - Do you have sleep apnea, excessive snoring or daytime drowsiness?  15. NO  - Do you have any prosthetic heart valves?  16. NO - Do you have prosthetic joints?  17. NO - Is there any chance that you may be pregnant?        HPI:                                                      Brief HPI related to upcoming procedure:       See problem list for active medical problems.  Problems all longstanding and stable, except as noted/documented.  See ROS for pertinent symptoms related to these conditions.                                                                                                  .  COPD - Patient has a longstanding history of moderate-severe COPD . Patient has been doing well overall noting stable sx of dyspnea and continues on medication regimen consisting of breo, albuterol, spiriva without adverse reactions or side effects.       MEDICAL HISTORY:                                                    Patient Active Problem List    Diagnosis Date Noted     ACP (advance care planning) 06/14/2017     Priority: Medium     Advance Care Planning 6/14/2017: ACP Review of Chart / Resources Provided:  Reviewed chart for advance care plan.  Latrice Henao has no plan or code status on file. Discussed available resources and provided with information.   Added by Loraine Chua        Advance Care Planning 5/20/2016: ACP Review of Chart / Resources Provided:  Reviewed chart for advance care plan.  Latrice Henao has no plan or code status on file. Discussed available resources and provided with information. Confirmed code status reflects current choices pending further ACP discussions.  Confirmed/documented legally designated decision makers.  Added by Deann Soto             Diastolic dysfunction 03/23/2017     Priority: Medium     Elevated cholesterol 03/20/2017     Priority: Medium     Hypertension 02/24/2014     Priority: Medium     COPD (chronic obstructive pulmonary disease) (H) 06/26/2013     Priority: Medium     Spirometry done 02/07/2017. FEV1 scor 55%  Moderate  severe Obstructive Airway Disease-  Panlobular Emphysematous type, reversible component         Past Medical History:   Diagnosis Date     Anxiety disorder      COPD (chronic obstructive pulmonary disease) (H)      Diastolic dysfunction      Hypertension      Past Surgical History:   Procedure Laterality Date     BLEPHAROPLASTY, BROW LIFT BILATERAL, COMBINED Bilateral 6/2/2016    Procedure: COMBINED BLEPHAROPLASTY, BROW LIFT BILATERAL;  Surgeon: David Vitale MD;  Location: [facility]     Current Outpatient Prescriptions   Medication Sig Dispense Refill     BREO ELLIPTA 100-25 MCG/INH oral inhaler Inhale 1 puff into the lungs daily 1 Inhaler 1     albuterol (ALBUTEROL) 108 (90 BASE) MCG/ACT Inhaler Inhale 2 puffs into the lungs every 6 hours as needed 3 Inhaler 4     escitalopram (LEXAPRO) 20 MG tablet Take 1 tablet (20 mg) by mouth daily 90 tablet 3     lisinopril (PRINIVIL/ZESTRIL) 5 MG tablet Take 1 tablet (5 mg) by mouth daily 90 tablet 1     LORazepam (ATIVAN) 0.5 MG tablet Take 1 tablet (0.5 mg) by mouth every 8 hours as needed for anxiety 10 tablet 0     tiotropium (SPIRIVA HANDIHALER) 18 MCG capsule INHALE CONTENTS OF 1 CAPSULE DAILY 30 capsule 11     loratadine 10 MG capsule Take 10 mg by mouth daily 30 capsule 11     OTC products: None, except as noted above    No Known Allergies   Latex Allergy: NO    Social History   Substance Use Topics     Smoking status: Former Smoker     Packs/day: 0.50     Years: 40.00     Types: Cigarettes     Quit date: 12/28/2012     Smokeless tobacco: Not on file      Comment: [employer]: current every day smoker - passive smoke exposure     Alcohol use No     History   Drug Use No       REVIEW OF SYSTEMS:                                                    C: NEGATIVE for fever, chills, change in weight  I: NEGATIVE for worrisome rashes, moles or lesions  E: NEGATIVE for vision changes or irritation  E/M: NEGATIVE for ear, mouth and throat problems  R: NEGATIVE for significant  "cough or SOB  B: NEGATIVE for masses, tenderness or discharge  CV: NEGATIVE for chest pain, palpitations or peripheral edema  GI: NEGATIVE for nausea, abdominal pain, heartburn, or change in bowel habits  : NEGATIVE for frequency, dysuria, or hematuria  MUSCULOSKELETAL:left knee is swollen and painful. Medial pain is the worse.  OA present.   N: NEGATIVE for weakness, dizziness or paresthesias  E: NEGATIVE for temperature intolerance, skin/hair changes  H: NEGATIVE for bleeding problems  P: NEGATIVE for changes in mood or affect    EXAM:                                                    /72 (BP Location: Left arm, Patient Position: Chair, Cuff Size: Adult Large)  Pulse 78  Temp 97.7  F (36.5  C) (Tympanic)  Ht 5' 3.75\" (1.619 m)  Wt 191 lb (86.6 kg)  SpO2 96%  Breastfeeding? No  BMI 33.04 kg/m2    GENERAL APPEARANCE: healthy, alert and no distress     EYES: EOMI, PERRL     HENT: ear canals and TM's normal and nose and mouth without ulcers or lesions     NECK: no adenopathy, no asymmetry, masses, or scars and thyroid normal to palpation     RESP: lungs clear to auscultation - no rales, rhonchi or wheezes     CV: regular rates and rhythm, normal S1 S2, no S3 or S4 and no murmur, click or rub     ABDOMEN:  soft, nontender, no HSM or masses and bowel sounds normal     MS: extremities normal- no gross deformities noted, no evidence of inflammation in joints, FROM in all extremities.     SKIN: no suspicious lesions or rashes     NEURO: Normal strength and tone, sensory exam grossly normal, mentation intact and speech normal     PSYCH: mentation appears normal. and affect normal/bright     LYMPHATICS: No axillary, cervical, or supraclavicular nodes    DIAGNOSTICS:                                                      EKG: appears normal, NSR, normal axis, normal intervals, no acute ST/T changes c/w ischemia, no LVH by voltage criteria, unchanged from previous tracings  Labs Resulted Today:   Results for " orders placed or performed during the hospital encounter of 10/05/17   Knee XR, 3 views, left    Narrative    Study:XR KNEE LT 3 VW    History:62 years Female pain    Comparisons:None    Technique: 3 views      Impression    IMPRESSION: Degenerative arthritis in the patellofemoral joint with  hypertrophic change and small effusion. Mild medial compartment  narrowing hypertrophic change.    JEROMY PALACIOS MD       Recent Labs   Lab Test  03/20/17   1207  02/07/17   0732  06/24/16   0945   09/08/15   0815   09/23/13   0846   HGB  13.2  13.4  14.2   < >   --    < >  14.3   PLT  260   --   242   < >   --    < >  270   NA  141   --   142   --    --    < >  137   POTASSIUM  4.2   --   3.7   --    --    < >  4.0   CR  0.71   --   0.68   --    --    < >  0.82   A1C   --    --    --    --   5.9   --   6.1    < > = values in this interval not displayed.        IMPRESSION:                                                    Reason for surgery/procedure: left knee meniscal repair.   Diagnosis/reason for consult: medical clearance.     The proposed surgical procedure is considered INTERMEDIATE risk.    REVISED CARDIAC RISK INDEX  The patient has the following serious cardiovascular risks for perioperative complications such as (MI, PE, VFib and 3  AV Block):  No serious cardiac risks  INTERPRETATION: 0 risks: Class I (very low risk - 0.4% complication rate)    The patient has the following additional risks for perioperative complications:  No identified additional risks        RECOMMENDATIONS:                                                    1. Preop general physical exam  Known pulmonary disease.  Has been seeing Dr. Grant. She has some mild pulmonary fibrosis.  Uses her inhalers and does well.  She is not having any pulmonary sx.   Ok for surgery.     - EKG 12-lead complete w/read - (Clinic Performed)  - CBC with platelets and differential        --Patient is to take all scheduled medications on the day of surgery EXCEPT  for modifications listed below.    ACE Inhibitor or Angiotensin Receptor Blocker (ARB) Use  Ace inhibitor or Angiotensin Receptor Blocker (ARB) and should HOLD this medication for the 24 hours prior to surgery.        APPROVAL GIVEN to proceed with proposed procedure, without further diagnostic evaluation       Signed Electronically by: JUSTIN Lira    Copy of this evaluation report is provided to requesting physician.    [facility] Preop Guidelines

## 2017-11-02 ASSESSMENT — ANXIETY QUESTIONNAIRES: GAD7 TOTAL SCORE: 1

## 2017-11-15 ENCOUNTER — MYC MEDICAL ADVICE (OUTPATIENT)
Dept: FAMILY MEDICINE | Facility: OTHER | Age: 62
End: 2017-11-15

## 2017-11-15 DIAGNOSIS — J43.1 PANLOBULAR EMPHYSEMA (H): ICD-10-CM

## 2017-11-15 DIAGNOSIS — J43.1 PANLOBULAR EMPHYSEMA (H): Primary | ICD-10-CM

## 2017-11-15 NOTE — TELEPHONE ENCOUNTER
Patient needs refill today, states is all out of medication. Last refill was 9/14/17. Last visit was 11/1/17. Spirometry up to date.  Deann Soto LPN

## 2017-11-16 NOTE — TELEPHONE ENCOUNTER
I will check on PA request. We need to receive PA info from the pharmacy with the denied claim information before we can start this.  Deann Soto LPN

## 2017-11-16 NOTE — TELEPHONE ENCOUNTER
I think I sent this in yesterday?  Now says waiting for prior Authorization?  Has no one been doing them since you have been gone?

## 2017-11-26 ENCOUNTER — HEALTH MAINTENANCE LETTER (OUTPATIENT)
Age: 62
End: 2017-11-26

## 2017-12-01 ENCOUNTER — TRANSFERRED RECORDS (OUTPATIENT)
Dept: HEALTH INFORMATION MANAGEMENT | Facility: HOSPITAL | Age: 62
End: 2017-12-01

## 2017-12-15 ENCOUNTER — TRANSFERRED RECORDS (OUTPATIENT)
Dept: HEALTH INFORMATION MANAGEMENT | Facility: HOSPITAL | Age: 62
End: 2017-12-15

## 2017-12-20 DIAGNOSIS — I51.89 DIASTOLIC DYSFUNCTION: ICD-10-CM

## 2017-12-20 RX ORDER — LISINOPRIL 5 MG/1
TABLET ORAL
Qty: 90 TABLET | Refills: 2 | Status: SHIPPED | OUTPATIENT
Start: 2017-12-20 | End: 2018-06-13

## 2017-12-20 NOTE — TELEPHONE ENCOUNTER
Lisinopril       Last Written Prescription Date: 2/15/2017  Last Fill Quantity: 90,  # refills: 0   Last Office Visit with [facility], [facility] or [facility] prescribing provider: 11/01/2017

## 2018-03-28 ENCOUNTER — OFFICE VISIT (OUTPATIENT)
Dept: FAMILY MEDICINE | Facility: OTHER | Age: 63
End: 2018-03-28
Attending: PHYSICIAN ASSISTANT
Payer: MEDICARE

## 2018-03-28 VITALS
BODY MASS INDEX: 34.15 KG/M2 | SYSTOLIC BLOOD PRESSURE: 118 MMHG | HEART RATE: 76 BPM | OXYGEN SATURATION: 95 % | TEMPERATURE: 98 F | DIASTOLIC BLOOD PRESSURE: 74 MMHG | HEIGHT: 64 IN | WEIGHT: 200 LBS

## 2018-03-28 DIAGNOSIS — J44.1 OBSTRUCTIVE CHRONIC BRONCHITIS WITH EXACERBATION (H): Primary | ICD-10-CM

## 2018-03-28 PROCEDURE — 99213 OFFICE O/P EST LOW 20 MIN: CPT | Performed by: PHYSICIAN ASSISTANT

## 2018-03-28 PROCEDURE — G0463 HOSPITAL OUTPT CLINIC VISIT: HCPCS

## 2018-03-28 RX ORDER — AZITHROMYCIN 250 MG/1
TABLET, FILM COATED ORAL
Qty: 6 TABLET | Refills: 1 | Status: SHIPPED | OUTPATIENT
Start: 2018-03-28 | End: 2018-06-13

## 2018-03-28 RX ORDER — CODEINE PHOSPHATE AND GUAIFENESIN 10; 100 MG/5ML; MG/5ML
1 SOLUTION ORAL EVERY 4 HOURS PRN
Qty: 180 ML | Refills: 0 | Status: SHIPPED | OUTPATIENT
Start: 2018-03-28

## 2018-03-28 RX ORDER — BENZONATATE 200 MG/1
200 CAPSULE ORAL 3 TIMES DAILY PRN
Qty: 30 CAPSULE | Refills: 1 | Status: SHIPPED | OUTPATIENT
Start: 2018-03-28

## 2018-03-28 ASSESSMENT — ANXIETY QUESTIONNAIRES
7. FEELING AFRAID AS IF SOMETHING AWFUL MIGHT HAPPEN: NOT AT ALL
4. TROUBLE RELAXING: NOT AT ALL
5. BEING SO RESTLESS THAT IT IS HARD TO SIT STILL: NOT AT ALL
3. WORRYING TOO MUCH ABOUT DIFFERENT THINGS: SEVERAL DAYS
6. BECOMING EASILY ANNOYED OR IRRITABLE: SEVERAL DAYS
GAD7 TOTAL SCORE: 3
2. NOT BEING ABLE TO STOP OR CONTROL WORRYING: SEVERAL DAYS
1. FEELING NERVOUS, ANXIOUS, OR ON EDGE: NOT AT ALL

## 2018-03-28 ASSESSMENT — PAIN SCALES - GENERAL: PAINLEVEL: NO PAIN (0)

## 2018-03-28 NOTE — MR AVS SNAPSHOT
After Visit Summary   3/28/2018    Latrice Henao    MRN: [id number]           Patient Information     Date Of Birth          1955        Visit Information        Provider Department      3/28/2018 10:00 AM Elsa Medrano PA [facility]        Today's Diagnoses     Obstructive chronic bronchitis with exacerbation (H)    -  1      Care Instructions      Thank you for choosing [facility].   I have office hours 8:00 am to 4:30 pm on Monday's, Wednesday's, Thursday's and Friday's. My nurse and I are out of the office every Tuesday.    Following your visit, when your labs and diagnostic testing have returned, I will review then and you will be contacted by my nurse.  If you are on My Chart, you can also view results there.    For refills, notify your pharmacy regarding what you need and the pharmacy will generate a refill request. Do not call my nurse as she is unable to process refill request. Please plan ahead and allow 3-5 days for refill requests.    You will generally receive a reminder call the day prior to your appointment.  If you cannot attend your appointment, please cancel your appointment with as much notice as possible.  If there is a pattern of failure to present for your appointments, I cannot provide consistent, meaningful, ongoing care for you. It is very important to me that you come in for your care, so we can best assist you with your health care needs.    IMPORTANT:  Please note that it is my standard of practice to NOT participate in prescribing ongoing requested Narcotic Analgesic therapy, and/or participate in the prescribing of other controlled substances.  My nurse and I am happy to assist you with the process of referral for alternative pain management as needed, and other treatment modalities including but not limited to:  Physical Therapy, Physical Medicine and Rehab, Counseling, Chiropractic Care, Orthopedic Care, and non-narcotic  medication management.     In the event that you need to be seen for emergent concerns and I am out of office,  please see one of my colleagues for acute concerns.  You may also present to UC or ER.  I appreciate the opportunity to serve you and look forward to supporting your healthcare needs in the future. Please contact me with any questions or concerns that you may have.    Sincerely,      Elsa Medrano RN, PA-C               Follow-ups after your visit        Your next 10 appointments already scheduled     Apr 24, 2018  9:30 AM CDT   Pulmonary Function with [facility]   [facility] ([facility] )    [address] [place] [id number] [phone]           No Inhalers for 6 hours prior to test No Smoking 2 hours prior to test              Who to contact     If you have questions or need follow up information about today's clinic visit or your schedule please contact [facility] directly at [phone].  Normal or non-critical lab and imaging results will be communicated to you by [facility], letter or phone within 4 business days after the clinic has received the results. If you do not hear from us within 7 days, please contact the clinic through [facility] or phone. If you have a critical or abnormal lab result, we will notify you by phone as soon as possible.  Submit refill requests through [facility] or call your pharmacy and they will forward the refill request to us. Please allow 3 business days for your refill to be completed.          Additional Information About Your Visit        [facility] Information     [facility] gives you secure access to your electronic health record. If you see a primary care provider, you can also send messages to your care team and make appointments. If you have questions, please call your primary care clinic.  If you do not have a primary care provider, please call [phone] and they will assist you.        Care EveryWhere ID     This  "is your Care EveryWhere ID. This could be used by other organizations to access your [place] medical records  [id number]        Your Vitals Were     Pulse Temperature Height Pulse Oximetry BMI (Body Mass Index)       76 98  F (36.7  C) 5' 3.75\" (1.619 m) 95% 34.6 kg/m2        Blood Pressure from Last 3 Encounters:   03/28/18 118/74   11/01/17 134/72   10/05/17 156/75    Weight from Last 3 Encounters:   03/28/18 200 lb (90.7 kg)   11/01/17 191 lb (86.6 kg)   08/10/17 193 lb (87.5 kg)              Today, you had the following     No orders found for display         Today's Medication Changes          These changes are accurate as of 3/28/18 10:50 AM.  If you have any questions, ask your nurse or doctor.               Start taking these medicines.        Dose/Directions    azithromycin 250 MG tablet   Commonly known as:  ZITHROMAX   Used for:  Obstructive chronic bronchitis with exacerbation (H)   Started by:  Elsa Medrano PA        Two tablets first day, then one tablet daily for four days.   Quantity:  6 tablet   Refills:  1       benzonatate 200 MG capsule   Commonly known as:  TESSALON   Used for:  Obstructive chronic bronchitis with exacerbation (H)   Started by:  Elsa Medrano PA        Dose:  200 mg   Take 1 capsule (200 mg) by mouth 3 times daily as needed for cough   Quantity:  30 capsule   Refills:  1       guaiFENesin-codeine 100-10 MG/5ML Soln solution   Commonly known as:  ROBITUSSIN AC   Used for:  Obstructive chronic bronchitis with exacerbation (H)   Started by:  Elsa Medrano PA        Dose:  1 tsp.   Take 5 mLs by mouth every 4 hours as needed for cough   Quantity:  180 mL   Refills:  0            Where to get your medicines      These medications were sent to [facility] - [place], [place] - [address] [place] [id number]     Phone:  [phone]     azithromycin 250 MG tablet    benzonatate 200 MG capsule         Some of these will need a paper prescription " and others can be bought over the counter.  Ask your nurse if you have questions.     Bring a paper prescription for each of these medications     guaiFENesin-codeine 100-10 MG/5ML Soln solution                Primary Care Provider Office Phone # Fax #    JUSTIN Bansal [phone]       [facility] [address]  [place] [address]        Equal Access to Services     ROHAN [facility] HospitalELISABETH : Hadii aad ku hadasho Soomaali, waaxda luqadaha, qaybta kaalmada adeegyada, waxay idiin hayaan adeeg kharash la'aan ah. So [facility] [phone].    ATENCIÓN: Si habla español, tiene a rock disposición servicios gratuitos de asistencia lingüística. Llame al [phone].    We comply with applicable federal civil rights laws and [place] laws. We do not discriminate on the basis of race, color, national origin, age, disability, sex, sexual orientation, or gender identity.            Thank you!     Thank you for choosing [facility] [place]  for your care. Our goal is always to provide you with excellent care. Hearing back from our patients is one way we can continue to improve our services. Please take a few minutes to complete the written survey that you may receive in the mail after your visit with us. Thank you!             Your Updated Medication List - Protect others around you: Learn how to safely use, store and throw away your medicines at www.disposemymeds.org.          This list is accurate as of 3/28/18 10:50 AM.  Always use your most recent med list.                   Brand Name Dispense Instructions for use Diagnosis    albuterol 108 (90 BASE) MCG/ACT Inhaler    PROAIR HFA    3 Inhaler    Inhale 2 puffs into the lungs every 6 hours as needed    Panlobular emphysema (H)       azithromycin 250 MG tablet    ZITHROMAX    6 tablet    Two tablets first day, then one tablet daily for four days.    Obstructive chronic bronchitis with exacerbation (H)       benzonatate 200 MG capsule    TESSALON     30 capsule    Take 1 capsule (200 mg) by mouth 3 times daily as needed for cough    Obstructive chronic bronchitis with exacerbation (H)       BREO ELLIPTA 100-25 MCG/INH oral inhaler   Generic drug:  fluticasone-vilanterol     60 Inhaler    Inhale 1 puff into the lungs daily    Panlobular emphysema (H)       escitalopram 20 MG tablet    LEXAPRO    90 tablet    Take 1 tablet (20 mg) by mouth daily    AMY (generalized anxiety disorder)       guaiFENesin-codeine 100-10 MG/5ML Soln solution    ROBITUSSIN AC    180 mL    Take 5 mLs by mouth every 4 hours as needed for cough    Obstructive chronic bronchitis with exacerbation (H)       lisinopril 5 MG tablet    PRINIVIL/ZESTRIL    90 tablet    Take 1 tablet (5 mg) by mouth daily    Diastolic dysfunction       loratadine 10 MG capsule     30 capsule    Take 10 mg by mouth daily    Seasonal allergic rhinitis       LORazepam 0.5 MG tablet    ATIVAN    10 tablet    Take 1 tablet (0.5 mg) by mouth every 8 hours as needed for anxiety    AMY (generalized anxiety disorder)       tiotropium 18 MCG capsule    SPIRIVA HANDIHALER    30 capsule    INHALE CONTENTS OF 1 CAPSULE DAILY    Panlobular emphysema (H)

## 2018-03-28 NOTE — PROGRESS NOTES
SUBJECTIVE:   Latrice Henao is a 63 year old female who presents to clinic today for the following health issues:      RESPIRATORY SYMPTOMS      Duration: 3/27/2018    Description  rhinorrhea, facial pain/pressure, cough, headache and fatigue/malaise    Severity: moderate    Accompanying signs and symptoms: Cough up brownish mucous,pressure at head area,alot of drainage.    History (predisposing factors):  COPD    Precipitating or alleviating factors: Cold and wind makes the cough worse.    Therapies tried and outcome:  Guaifenesin,not feeling better.          Problem list and histories reviewed & adjusted, as indicated.  Additional history: as documented    Patient Active Problem List   Diagnosis     COPD (chronic obstructive pulmonary disease) (H)     Hypertension     ACP (advance care planning)     Elevated cholesterol     Diastolic dysfunction     Past Surgical History:   Procedure Laterality Date     BLEPHAROPLASTY, BROW LIFT BILATERAL, COMBINED Bilateral 6/2/2016    Procedure: COMBINED BLEPHAROPLASTY, BROW LIFT BILATERAL;  Surgeon: David Vitale MD;  Location: [facility] OR     ORTHOPEDIC SURGERY  11/2017    Left knee arthroscopic-[facility]       Social History   Substance Use Topics     Smoking status: Former Smoker     Packs/day: 0.50     Years: 40.00     Types: Cigarettes     Quit date: 12/28/2012     Smokeless tobacco: Never Used      Comment: Next[employer]: current every day smoker - passive smoke exposure     Alcohol use No     Family History   Problem Relation Age of Onset     Myocardial Infarction Paternal Grandmother      myocardial infarction - cause of death     CANCER Father      lymphoma         Current Outpatient Prescriptions   Medication Sig Dispense Refill     lisinopril (PRINIVIL/ZESTRIL) 5 MG tablet Take 1 tablet (5 mg) by mouth daily 90 tablet 2     BREO ELLIPTA 100-25 MCG/INH oral inhaler Inhale 1 puff into the lungs daily 60 Inhaler 11     albuterol (ALBUTEROL) 108 (90 BASE)  "MCG/ACT Inhaler Inhale 2 puffs into the lungs every 6 hours as needed 3 Inhaler 4     escitalopram (LEXAPRO) 20 MG tablet Take 1 tablet (20 mg) by mouth daily 90 tablet 3     LORazepam (ATIVAN) 0.5 MG tablet Take 1 tablet (0.5 mg) by mouth every 8 hours as needed for anxiety 10 tablet 0     tiotropium (SPIRIVA HANDIHALER) 18 MCG capsule INHALE CONTENTS OF 1 CAPSULE DAILY 30 capsule 11     loratadine 10 MG capsule Take 10 mg by mouth daily 30 capsule 11     No Known Allergies  BP Readings from Last 3 Encounters:   03/28/18 118/74   11/01/17 134/72   10/05/17 156/75    Wt Readings from Last 3 Encounters:   03/28/18 200 lb (90.7 kg)   11/01/17 191 lb (86.6 kg)   08/10/17 193 lb (87.5 kg)                    Reviewed and updated as needed this visit by clinical staff  Tobacco  Allergies  Meds  Med Hx  Surg Hx  Fam Hx  Soc Hx      Reviewed and updated as needed this visit by Provider         ROS:  Constitutional, neuro, ENT, endocrine, pulmonary, cardiac, gastrointestinal, genitourinary, musculoskeletal, integument and psychiatric systems are negative, except as otherwise noted.    OBJECTIVE:                                                    /74  Pulse 76  Temp 98  F (36.7  C)  Ht 5' 3.75\" (1.619 m)  Wt 200 lb (90.7 kg)  SpO2 95%  BMI 34.6 kg/m2  Body mass index is 34.6 kg/(m^2).  GENERAL APPEARANCE: healthy, alert and no distress  EYES: Eyes grossly normal to inspection, PERRL and conjunctivae and sclerae normal  HENT: ear canals and TM's normal and nose and mouth without ulcers or lesions  NECK: no adenopathy, no asymmetry, masses, or scars and thyroid normal to palpation  RESP: lungs clear to auscultation - no rales, rhonchi or wheezes  CV: regular rates and rhythm, normal S1 S2, no S3 or S4 and no murmur, click or rub  LYMPHATICS: no cervical adenopathy  SKIN: no suspicious lesions or rashes  NEURO: Normal strength and tone, mentation intact and speech normal  PSYCH: mentation appears normal and " affect normal/bright    Diagnostic test results:  Diagnostic Test Results:  none      ASSESSMENT/PLAN:                                                    1. Obstructive chronic bronchitis with exacerbation (H)  She started 24 hours ago.  Coughing spell and just fatigue.  No fever.  coughing all night and underlying COPD . No wheezing today.  Very pale and tired looking.  Seeing Pulmonary in a few weeks.  Will let us know if fever starts will let us know.   - benzonatate (TESSALON) 200 MG capsule; Take 1 capsule (200 mg) by mouth 3 times daily as needed for cough  Dispense: 30 capsule; Refill: 1  - azithromycin (ZITHROMAX) 250 MG tablet; Two tablets first day, then one tablet daily for four days.  Dispense: 6 tablet; Refill: 1  - guaiFENesin-codeine (ROBITUSSIN AC) 100-10 MG/5ML SOLN solution; Take 5 mLs by mouth every 4 hours as needed for cough  Dispense: 180 mL; Refill: 0      See Patient Instructions    JUSTIN Lira  [facility]

## 2018-03-28 NOTE — NURSING NOTE
"Chief Complaint   Patient presents with     URI       Initial /74  Pulse 76  Temp 98  F (36.7  C)  Ht 5' 3.75\" (1.619 m)  Wt 200 lb (90.7 kg)  SpO2 95%  BMI 34.6 kg/m2 Estimated body mass index is 34.6 kg/(m^2) as calculated from the following:    Height as of this encounter: 5' 3.75\" (1.619 m).    Weight as of this encounter: 200 lb (90.7 kg).  Medication Reconciliation: complete   [facility]   Medical Assistant      "

## 2018-03-29 ASSESSMENT — ANXIETY QUESTIONNAIRES: GAD7 TOTAL SCORE: 3

## 2018-03-29 ASSESSMENT — PATIENT HEALTH QUESTIONNAIRE - PHQ9: SUM OF ALL RESPONSES TO PHQ QUESTIONS 1-9: 1

## 2018-04-20 DIAGNOSIS — J43.1 PANLOBULAR EMPHYSEMA (H): ICD-10-CM

## 2018-04-20 NOTE — TELEPHONE ENCOUNTER
BREO Ellipta 100-25 mcg BLST W/DEV  Last Refill: 11/15/17  Last Visit: 3/28/17  Qty: 60  Refills: 11

## 2018-04-23 NOTE — TELEPHONE ENCOUNTER
Called pharmacy to not fill the Breo that was just signed. Refill must route to a provider to advise.     Breo Ellipta 200-25 mcg  Last visit: 3/28/18  Last refill: 11/16/17 end 3/28/18 by MATTHEW Medrano therapy completed.     MATTHEW Medrano out all week. I will route this to Dr. Clark to advise.

## 2018-04-24 ENCOUNTER — HOSPITAL ENCOUNTER (OUTPATIENT)
Dept: RESPIRATORY THERAPY | Facility: HOSPITAL | Age: 63
Discharge: HOME | End: 2018-04-24
Attending: INTERNAL MEDICINE | Admitting: INTERNAL MEDICINE
Payer: MEDICARE

## 2018-04-24 PROCEDURE — 94060 EVALUATION OF WHEEZING: CPT | Mod: 26 | Performed by: INTERNAL MEDICINE

## 2018-04-24 PROCEDURE — 94729 DIFFUSING CAPACITY: CPT

## 2018-04-24 PROCEDURE — 25000125 ZZHC RX 250

## 2018-04-24 PROCEDURE — 94060 EVALUATION OF WHEEZING: CPT

## 2018-04-24 PROCEDURE — 94726 PLETHYSMOGRAPHY LUNG VOLUMES: CPT

## 2018-04-24 PROCEDURE — 94729 DIFFUSING CAPACITY: CPT | Mod: 26 | Performed by: INTERNAL MEDICINE

## 2018-04-24 PROCEDURE — 94726 PLETHYSMOGRAPHY LUNG VOLUMES: CPT | Mod: 26 | Performed by: INTERNAL MEDICINE

## 2018-04-24 RX ORDER — ALBUTEROL SULFATE 0.83 MG/ML
2.5 SOLUTION RESPIRATORY (INHALATION)
Status: COMPLETED | OUTPATIENT
Start: 2018-04-24 | End: 2018-04-24

## 2018-04-24 RX ADMIN — ALBUTEROL SULFATE 2.5 MG: 2.5 SOLUTION RESPIRATORY (INHALATION) at 10:00

## 2018-05-21 ENCOUNTER — HEALTH MAINTENANCE LETTER (OUTPATIENT)
Age: 63
End: 2018-05-21

## 2018-06-08 NOTE — PROGRESS NOTES
SUBJECTIVE:   Latrice Henao is a 63 year old female who presents for Preventive Visit.  Are you in the first 12 months of your Medicare Part B coverage?  Yes,  Visual Acuity:  Right Eye: 20/20   Left Eye: 20/20  Both Eyes: 20/20    Healthy Habits:    Do you get at least three servings of calcium containing foods daily (dairy, green leafy vegetables, etc.)? yes    Amount of exercise or daily activities, outside of work: Tries to walk 5 miles everyday.    Problems taking medications regularly No    Medication side effects: No    Have you had an eye exam in the past two years? no    Do you see a dentist twice per year? yes    Do you have sleep apnea, excessive snoring or daytime drowsiness?no      Ability to successfully perform activities of daily living: No, needs assistance with: housework Dues to breathing    Home safety:  none identified     Hearing impairment: No    Fall risk:  Fallen 2 or more times in the past year?: No  Any fall with injury in the past year?: No    COGNITIVE SCREEN  1) Repeat 3 items (Banana, Sunrise, Chair)    2) Clock draw: NORMAL  3) 3 item recall: Recalls 1 object   Results: NORMAL clock, 1-2 items recalled: COGNITIVE IMPAIRMENT LESS LIKELY    Mini-CogTM Copyright S Rossi. Licensed by the author for use in [facility]; reprinted with permission (rj@[facility]). All rights reserved.            Hyperlipidemia Follow-Up      Rate your low fat/cholesterol diet?: good    Taking statin?  No    Other lipid medications/supplements?:  none    Hypertension Follow-up      Outpatient blood pressures being checked at home results 90/70 to low 80    Low Salt Diet: not monitoring salt      Reviewed and updated as needed this visit by clinical staff  Tobacco  Allergies  Meds  Med Hx  Surg Hx  Fam Hx  Soc Hx        Reviewed and updated as needed this visit by Provider        Social History   Substance Use Topics     Smoking status: Former Smoker     Packs/day: 0.50     Years:  40.00     Types: Cigarettes     Quit date: 12/28/2012     Smokeless tobacco: Never Used      Comment: NextGen: current every day smoker - passive smoke exposure     Alcohol use No       If you drink alcohol do you typically have >3 drinks per day or >7 drinks per week? Yes - AUDIT SCORE:     No flowsheet data found.                        Today's PHQ-2 Score:   PHQ-2 ( 1999 Pfizer) 6/26/2013   Q1: Little interest or pleasure in doing things 0   Q2: Feeling down, depressed or hopeless 0   PHQ-2 Score 0       Do you feel safe in your environment - Yes    Do you have a Health Care Directive?: No: Advance care planning reviewed with patient; information given to patient to review.    Current providers sharing in care for this patient include:   Patient Care Team:  Elsa Medrano PA as PCP - General    The following health maintenance items are reviewed in Epic and correct as of today:  Health Maintenance   Topic Date Due     HIV SCREEN (SYSTEM ASSIGNED)  03/07/1971     HEPATITIS C SCREENING  03/07/1971     COLON CANCER SCREEN (SYSTEM ASSIGNED)  03/07/2003     BMP Q6 MOS  09/20/2017     PNEUMOCOCCAL (1 of 2 - PCV13) 03/20/2018     STOOL GUIAC Q1 YR.  06/21/2018     AMY QUESTIONNAIRE 6 MONTHS  09/28/2018     PHQ-9 Q6 MONTHS  09/28/2018     FALL RISK ASSESSMENT  03/28/2019     COPD ACTION PLAN Q1 YR  06/13/2019     MAMMO SCREEN Q2 YR (SYSTEM ASSIGNED)  06/21/2019     PNEUMO 5YR BOOST DUE AFTER AGE 65 (NO IB MSG) (2) 03/20/2022     LIPID SCREEN Q5 YR FEMALE (SYSTEM ASSIGNED)  03/20/2022     ADVANCE DIRECTIVE PLANNING Q5 YRS  06/13/2023     TETANUS IMMUNIZATION (SYSTEM ASSIGNED)  06/08/2025     SPIROMETRY ONETIME  Completed     DEXA SCAN SCREENING (SYSTEM ASSIGNED)  Completed     INFLUENZA VACCINE  Completed     Labs reviewed in EPIC  BP Readings from Last 3 Encounters:   06/13/18 126/70   03/28/18 118/74   11/01/17 134/72    Wt Readings from Last 3 Encounters:   06/13/18 198 lb (89.8 kg)   03/28/18 200 lb (90.7 kg)    11/01/17 191 lb (86.6 kg)                  Patient Active Problem List   Diagnosis     COPD (chronic obstructive pulmonary disease) (H)     Hypertension     ACP (advance care planning)     Elevated cholesterol     Diastolic dysfunction     Past Surgical History:   Procedure Laterality Date     BLEPHAROPLASTY, BROW LIFT BILATERAL, COMBINED Bilateral 6/2/2016    Procedure: COMBINED BLEPHAROPLASTY, BROW LIFT BILATERAL;  Surgeon: David Vitale MD;  Location: [facility]     ORTHOPEDIC SURGERY  11/2017    Left knee arthroscopic-[facility]       Social History   Substance Use Topics     Smoking status: Former Smoker     Packs/day: 0.50     Years: 40.00     Types: Cigarettes     Quit date: 12/28/2012     Smokeless tobacco: Never Used      Comment: NextGen: current every day smoker - passive smoke exposure     Alcohol use No     Family History   Problem Relation Age of Onset     Myocardial Infarction Paternal Grandmother      myocardial infarction - cause of death     CANCER Father      lymphoma         Current Outpatient Prescriptions   Medication Sig Dispense Refill     albuterol (PROAIR HFA) 108 (90 Base) MCG/ACT Inhaler Inhale 2 puffs into the lungs every 6 hours as needed for shortness of breath / dyspnea or wheezing 18 g 11     benzonatate (TESSALON) 200 MG capsule Take 1 capsule (200 mg) by mouth 3 times daily as needed for cough 30 capsule 1     escitalopram (LEXAPRO) 20 MG tablet Take 1 tablet (20 mg) by mouth daily 90 tablet 3     Fluticasone-Umeclidin-Vilanterol (TRELEGY ELLIPTA) 100-62.5-25 MCG/INH oral inhaler Inhale 1 puff into the lungs daily       guaiFENesin-codeine (ROBITUSSIN AC) 100-10 MG/5ML SOLN solution Take 5 mLs by mouth every 4 hours as needed for cough 180 mL 0     lisinopril (PRINIVIL/ZESTRIL) 5 MG tablet Take 1 tablet (5 mg) by mouth daily 90 tablet 2     loratadine 10 MG capsule Take 10 mg by mouth daily 30 capsule 11     LORazepam (ATIVAN) 0.5 MG tablet Take 1 tablet (0.5 mg) by mouth  "every 8 hours as needed for anxiety 10 tablet 0     VITAMIN E COMPLEX PO        [DISCONTINUED] albuterol (ALBUTEROL) 108 (90 BASE) MCG/ACT Inhaler Inhale 2 puffs into the lungs every 6 hours as needed 3 Inhaler 4     [DISCONTINUED] albuterol (PROAIR HFA) 108 (90 Base) MCG/ACT Inhaler Inhale 2 puffs into the lungs every 6 hours       [DISCONTINUED] escitalopram (LEXAPRO) 20 MG tablet Take 1 tablet (20 mg) by mouth daily 90 tablet 3     [DISCONTINUED] lisinopril (PRINIVIL/ZESTRIL) 5 MG tablet Take 1 tablet (5 mg) by mouth daily 90 tablet 2     No Known Allergies    Pneumonia Vaccine:Adults age 65+ who received Pneumovax (PPSV23) at 65 years or older: Should be given PCV13 > 1 year after their most recent PPSV23  Mammogram Screening: Patient over age 50, mutual decision to screen reflected in health maintenance.  History of abnormal Pap smear: Last 3 Pap Results: No results found for: PAP    ROS:  Constitutional, HEENT, cardiovascular, pulmonary, GI, , musculoskeletal, neuro, skin, endocrine and psych systems are negative, except as otherwise noted.    OBJECTIVE:   /70  Pulse 69  Temp 97.8  F (36.6  C)  Ht 5' 5.16\" (1.655 m)  Wt 198 lb (89.8 kg)  SpO2 95%  BMI 32.79 kg/m2 Estimated body mass index is 32.79 kg/(m^2) as calculated from the following:    Height as of this encounter: 5' 5.16\" (1.655 m).    Weight as of this encounter: 198 lb (89.8 kg).  EXAM:   GENERAL: healthy, alert and no distress  EYES: Eyes grossly normal to inspection, PERRL and conjunctivae and sclerae normal  HENT: ear canals and TM's normal, nose and mouth without ulcers or lesions  NECK: no adenopathy, no asymmetry, masses, or scars and thyroid normal to palpation  RESP: lungs clear to auscultation - no rales, rhonchi or wheezes  BREAST: normal without masses, tenderness or nipple discharge and no palpable axillary masses or adenopathy  CV: regular rate and rhythm, normal S1 S2, no S3 or S4, no murmur, click or rub, no peripheral " edema and peripheral pulses strong  ABDOMEN: soft, nontender, no hepatosplenomegaly, no masses and bowel sounds normal   (female): normal female external genitalia, normal urethral meatus, vaginal mucosa pink, moist, well rugated, and normal cervix/adnexa/uterus without masses or discharge  MS: no gross musculoskeletal defects noted, no edema  SKIN: no suspicious lesions or rashes  NEURO: Normal strength and tone, mentation intact and speech normal  PSYCH: mentation appears normal, affect normal/bright    ASSESSMENT / PLAN:   1. Well woman exam  She is doing well not wanting pelvic exam.   She is going to be having mammogram and getting colon screening.  She will also be having labs done fro below. All screening tests are agree to except colonoscopy and will do a IFOB.  She is also seeing pulmonary and her FEV1 is up to date.       2. Screening for human immunodeficiency virus  Due for this.   HIV screening Combo    3. Encounter for HCV screening test for high risk patient  Screening done       4. Special screening for malignant neoplasms, colon  Willing to do IFOB    5. Essential hypertension  She is going to have labs.   - BASIC METABOLIC PANEL  - UA reflex to Microscopic and Culture - [place]    6. Other emphysema (H)  Refill.   - albuterol (PROAIR HFA) 108 (90 Base) MCG/ACT Inhaler; Inhale 2 puffs into the lungs every 6 hours as needed for shortness of breath / dyspnea or wheezing  Dispense: 18 g; Refill: 11    7. Elevated cholesterol  Recheck liver and lipid.  Is exercising.   - Hepatic panel (Albumin, ALT, AST, Bili, Alk Phos, TP)  - Lipid Profile    8. Encounter for screening mammogram for breast cancer  Ready for mammogram.   - MA Screening Digital Bilateral; Future    9. AMY (generalized anxiety disorder)  Needing her anxiety   - escitalopram (LEXAPRO) 20 MG tablet; Take 1 tablet (20 mg) by mouth daily  Dispense: 90 tablet; Refill: 3    10. Diastolic dysfunction  She is doing very well. Breathing well  "good blood pressure and we will check her labs.   - lisinopril (PRINIVIL/ZESTRIL) 5 MG tablet; Take 1 tablet (5 mg) by mouth daily  Dispense: 90 tablet; Refill: 2    End of Life Planning:  Patient currently has an advanced directive: No.  I have verified the patient's ablity to prepare an advanced directive/make health care decisions.  Literature was provided to assist patient in preparing an advanced directive.    COUNSELING:  Reviewed preventive health counseling, as reflected in patient instructions       Regular exercise       Healthy diet/nutrition       Vision screening       Hearing screening       Dental care       Osteoporosis Prevention/Bone Health       Colon cancer screening       Advanced Planning         Estimated body mass index is 32.79 kg/(m^2) as calculated from the following:    Height as of this encounter: 5' 5.16\" (1.655 m).    Weight as of this encounter: 198 lb (89.8 kg).       reports that she quit smoking about 5 years ago. Her smoking use included Cigarettes. She has a 20.00 pack-year smoking history. She has never used smokeless tobacco.      Appropriate preventive services were discussed with this patient, including applicable screening as appropriate for cardiovascular disease, diabetes, osteopenia/osteoporosis, and glaucoma.  As appropriate for age/gender, discussed screening for colorectal cancer, prostate cancer, breast cancer, and cervical cancer. Checklist reviewing preventive services available has been given to the patient.    Reviewed patients plan of care and provided an AVS. The Intermediate Care Plan ( asthma action plan, low back pain action plan, and migraine action plan) for Latrice meets the Care Plan requirement. This Care Plan has been established and reviewed with the Patient.    Counseling Resources:  ATP IV Guidelines  Pooled Cohorts Equation Calculator  Breast Cancer Risk Calculator  FRAX Risk Assessment  ICSI Preventive Guidelines  Dietary Guidelines for Americans, " 2010  USDA's MyPlate  ASA Prophylaxis  Lung CA Screening    JUSTIN Lira  [facility]

## 2018-06-13 ENCOUNTER — OFFICE VISIT (OUTPATIENT)
Dept: FAMILY MEDICINE | Facility: OTHER | Age: 63
End: 2018-06-13
Attending: PHYSICIAN ASSISTANT
Payer: MEDICARE

## 2018-06-13 VITALS
SYSTOLIC BLOOD PRESSURE: 126 MMHG | WEIGHT: 198 LBS | OXYGEN SATURATION: 95 % | HEART RATE: 69 BPM | DIASTOLIC BLOOD PRESSURE: 70 MMHG | HEIGHT: 65 IN | TEMPERATURE: 97.8 F | BODY MASS INDEX: 32.99 KG/M2

## 2018-06-13 DIAGNOSIS — E78.00 ELEVATED CHOLESTEROL: ICD-10-CM

## 2018-06-13 DIAGNOSIS — Z12.31 ENCOUNTER FOR SCREENING MAMMOGRAM FOR BREAST CANCER: ICD-10-CM

## 2018-06-13 DIAGNOSIS — Z11.4 SCREENING FOR HUMAN IMMUNODEFICIENCY VIRUS: ICD-10-CM

## 2018-06-13 DIAGNOSIS — I51.89 DIASTOLIC DYSFUNCTION: ICD-10-CM

## 2018-06-13 DIAGNOSIS — Z91.89 ENCOUNTER FOR HCV SCREENING TEST FOR HIGH RISK PATIENT: ICD-10-CM

## 2018-06-13 DIAGNOSIS — Z12.11 SPECIAL SCREENING FOR MALIGNANT NEOPLASMS, COLON: ICD-10-CM

## 2018-06-13 DIAGNOSIS — Z01.419 WELL WOMAN EXAM: Primary | ICD-10-CM

## 2018-06-13 DIAGNOSIS — I10 ESSENTIAL HYPERTENSION: ICD-10-CM

## 2018-06-13 DIAGNOSIS — F41.1 GAD (GENERALIZED ANXIETY DISORDER): ICD-10-CM

## 2018-06-13 DIAGNOSIS — J43.8 OTHER EMPHYSEMA (H): ICD-10-CM

## 2018-06-13 DIAGNOSIS — Z11.59 ENCOUNTER FOR HCV SCREENING TEST FOR HIGH RISK PATIENT: ICD-10-CM

## 2018-06-13 LAB
ALBUMIN SERPL-MCNC: 4.2 G/DL (ref 3.4–5)
ALBUMIN UR-MCNC: NEGATIVE MG/DL
ALP SERPL-CCNC: 73 U/L (ref 40–150)
ALT SERPL W P-5'-P-CCNC: 23 U/L (ref 0–50)
ANION GAP SERPL CALCULATED.3IONS-SCNC: 8 MMOL/L (ref 3–14)
APPEARANCE UR: CLEAR
AST SERPL W P-5'-P-CCNC: 20 U/L (ref 0–45)
BACTERIA #/AREA URNS HPF: ABNORMAL /HPF
BILIRUB DIRECT SERPL-MCNC: 0.2 MG/DL (ref 0–0.2)
BILIRUB SERPL-MCNC: 0.6 MG/DL (ref 0.2–1.3)
BILIRUB UR QL STRIP: NEGATIVE
BUN SERPL-MCNC: 10 MG/DL (ref 7–30)
CALCIUM SERPL-MCNC: 9.1 MG/DL (ref 8.5–10.1)
CHLORIDE SERPL-SCNC: 106 MMOL/L (ref 94–109)
CHOLEST SERPL-MCNC: 190 MG/DL
CO2 SERPL-SCNC: 27 MMOL/L (ref 20–32)
COLOR UR AUTO: ABNORMAL
CREAT SERPL-MCNC: 0.68 MG/DL (ref 0.52–1.04)
GFR SERPL CREATININE-BSD FRML MDRD: 88 ML/MIN/1.7M2
GLUCOSE SERPL-MCNC: 102 MG/DL (ref 70–99)
GLUCOSE UR STRIP-MCNC: NEGATIVE MG/DL
HDLC SERPL-MCNC: 68 MG/DL
HGB UR QL STRIP: NEGATIVE
KETONES UR STRIP-MCNC: NEGATIVE MG/DL
LDLC SERPL CALC-MCNC: 105 MG/DL
LEUKOCYTE ESTERASE UR QL STRIP: ABNORMAL
NITRATE UR QL: NEGATIVE
NONHDLC SERPL-MCNC: 122 MG/DL
PH UR STRIP: 6 PH (ref 4.7–8)
POTASSIUM SERPL-SCNC: 3.2 MMOL/L (ref 3.4–5.3)
PROT SERPL-MCNC: 8.2 G/DL (ref 6.8–8.8)
RBC #/AREA URNS AUTO: <1 /HPF (ref 0–2)
SODIUM SERPL-SCNC: 141 MMOL/L (ref 133–144)
SOURCE: ABNORMAL
SP GR UR STRIP: 1.01 (ref 1–1.03)
SQUAMOUS #/AREA URNS AUTO: 3 /HPF (ref 0–1)
TRIGL SERPL-MCNC: 87 MG/DL
UROBILINOGEN UR STRIP-MCNC: NORMAL MG/DL (ref 0–2)
WBC #/AREA URNS AUTO: <1 /HPF (ref 0–5)

## 2018-06-13 PROCEDURE — 80061 LIPID PANEL: CPT | Mod: ZL | Performed by: PHYSICIAN ASSISTANT

## 2018-06-13 PROCEDURE — 99397 PER PM REEVAL EST PAT 65+ YR: CPT | Performed by: PHYSICIAN ASSISTANT

## 2018-06-13 PROCEDURE — 80076 HEPATIC FUNCTION PANEL: CPT | Mod: ZL | Performed by: PHYSICIAN ASSISTANT

## 2018-06-13 PROCEDURE — 80048 BASIC METABOLIC PNL TOTAL CA: CPT | Mod: ZL | Performed by: PHYSICIAN ASSISTANT

## 2018-06-13 PROCEDURE — 81001 URINALYSIS AUTO W/SCOPE: CPT | Mod: ZL | Performed by: PHYSICIAN ASSISTANT

## 2018-06-13 PROCEDURE — 87389 HIV-1 AG W/HIV-1&-2 AB AG IA: CPT | Mod: ZL | Performed by: PHYSICIAN ASSISTANT

## 2018-06-13 PROCEDURE — G0472 HEP C SCREEN HIGH RISK/OTHER: HCPCS | Mod: ZL | Performed by: PHYSICIAN ASSISTANT

## 2018-06-13 PROCEDURE — 36415 COLL VENOUS BLD VENIPUNCTURE: CPT | Mod: ZL | Performed by: PHYSICIAN ASSISTANT

## 2018-06-13 RX ORDER — ESCITALOPRAM OXALATE 20 MG/1
20 TABLET ORAL DAILY
Qty: 90 TABLET | Refills: 3 | Status: SHIPPED | OUTPATIENT
Start: 2018-06-13

## 2018-06-13 RX ORDER — LISINOPRIL 5 MG/1
TABLET ORAL
Qty: 90 TABLET | Refills: 2 | Status: SHIPPED | OUTPATIENT
Start: 2018-06-13 | End: 2019-02-25

## 2018-06-13 RX ORDER — ALBUTEROL SULFATE 90 UG/1
2 AEROSOL, METERED RESPIRATORY (INHALATION) EVERY 6 HOURS PRN
Qty: 18 G | Refills: 11 | Status: SHIPPED | OUTPATIENT
Start: 2018-06-13

## 2018-06-13 RX ORDER — ALBUTEROL SULFATE 90 UG/1
2 AEROSOL, METERED RESPIRATORY (INHALATION) EVERY 6 HOURS
COMMUNITY
End: 2018-06-13

## 2018-06-13 ASSESSMENT — ANXIETY QUESTIONNAIRES
7. FEELING AFRAID AS IF SOMETHING AWFUL MIGHT HAPPEN: NOT AT ALL
3. WORRYING TOO MUCH ABOUT DIFFERENT THINGS: SEVERAL DAYS
4. TROUBLE RELAXING: NOT AT ALL
5. BEING SO RESTLESS THAT IT IS HARD TO SIT STILL: NOT AT ALL
6. BECOMING EASILY ANNOYED OR IRRITABLE: NOT AT ALL
2. NOT BEING ABLE TO STOP OR CONTROL WORRYING: NOT AT ALL
GAD7 TOTAL SCORE: 1
1. FEELING NERVOUS, ANXIOUS, OR ON EDGE: NOT AT ALL

## 2018-06-13 ASSESSMENT — PAIN SCALES - GENERAL: PAINLEVEL: NO PAIN (0)

## 2018-06-13 NOTE — NURSING NOTE
"Chief Complaint   Patient presents with     Physical     Hypertension     Lipids       Initial /70  Pulse 69  Temp 97.8  F (36.6  C)  Ht 5' 5.16\" (1.655 m)  Wt 198 lb (89.8 kg)  SpO2 95%  BMI 32.79 kg/m2 Estimated body mass index is 32.79 kg/(m^2) as calculated from the following:    Height as of this encounter: 5' 5.16\" (1.655 m).    Weight as of this encounter: 198 lb (89.8 kg).  Medication Reconciliation: geoavnny Reinoso MA    "

## 2018-06-13 NOTE — MR AVS SNAPSHOT
After Visit Summary   6/13/2018    Latrice Henao    MRN: [id number]           Patient Information     Date Of Birth          1955        Visit Information        Provider Department      6/13/2018 8:40 AM Elsa Medrano PA [facility] [place]        Today's Diagnoses     Well woman exam    -  1    Screening for human immunodeficiency virus        Encounter for HCV screening test for high risk patient        Special screening for malignant neoplasms, colon        Essential hypertension        Other emphysema (H)        Elevated cholesterol        Encounter for screening mammogram for breast cancer        AMY (generalized anxiety disorder)        Diastolic dysfunction          Care Instructions      Preventive Health Recommendations  Female Ages 65 +    Yearly exam:     See your health care provider every year in order to  o Review health changes.   o Discuss preventive care.    o Review your medicines if your doctor has prescribed any.      You no longer need a yearly Pap test unless you've had an abnormal Pap test in the past 10 years. If you have vaginal symptoms, such as bleeding or discharge, be sure to talk with your provider about a Pap test.      Every 1 to 2 years, have a mammogram.  If you are over 69, talk with your health care provider about whether or not you want to continue having screening mammograms.      Every 10 years, have a colonoscopy. Or, have a yearly FIT test (stool test). These exams will check for colon cancer.       Have a cholesterol test every 5 years, or more often if your doctor advises it.       Have a diabetes test (fasting glucose) every three years. If you are at risk for diabetes, you should have this test more often.       At age 65, have a bone density scan (DEXA) to check for osteoporosis (brittle bone disease).    Shots:    Get a flu shot each year.    Get a tetanus shot every 10 years.    Talk to your doctor about your pneumonia vaccines.  There are now two you should receive - Pneumovax (PPSV 23) and Prevnar (PCV 13).    Talk to your doctor about the shingles vaccine.    Talk to your doctor about the hepatitis B vaccine.    Nutrition:     Eat at least 5 servings of fruits and vegetables each day.      Eat whole-grain bread, whole-wheat pasta and brown rice instead of white grains and rice.      Talk to your provider about Calcium and Vitamin D.     Lifestyle    Exercise at least 150 minutes a week (30 minutes a day, 5 days a week). This will help you control your weight and prevent disease.      Limit alcohol to one drink per day.      No smoking.       Wear sunscreen to prevent skin cancer.       See your dentist twice a year for an exam and cleaning.      See your eye doctor every 1 to 2 years to screen for conditions such as glaucoma, macular degeneration, cataracts, etc           Follow-ups after your visit        Follow-up notes from your care team     Return in about 6 months (around 12/13/2018).      Future tests that were ordered for you today     Open Future Orders        Priority Expected Expires Ordered    Immunos occult blood Routine  6/13/2019 6/13/2018    MA Screening Digital Bilateral Routine  6/13/2019 6/13/2018            Who to contact     If you have questions or need follow up information about today's clinic visit or your schedule please contact [facility] directly at [phone].  Normal or non-critical lab and imaging results will be communicated to you by MyChart, letter or phone within 4 business days after the clinic has received the results. If you do not hear from us within 7 days, please contact the clinic through MyChart or phone. If you have a critical or abnormal lab result, we will notify you by phone as soon as possible.  Submit refill requests through [employer] or call your pharmacy and they will forward the refill request to us. Please allow 3 business days for your refill to be completed.           "Additional Information About Your Visit        [employer] Information     [employer] gives you secure access to your electronic health record. If you see a primary care provider, you can also send messages to your care team and make appointments. If you have questions, please call your primary care clinic.  If you do not have a primary care provider, please call [phone] and they will assist you.        Care EveryWhere ID     This is your Care EveryWhere ID. This could be used by other organizations to access your [place] medical records  [id number]        Your Vitals Were     Pulse Temperature Height Pulse Oximetry BMI (Body Mass Index)       69 97.8  F (36.6  C) 5' 5.16\" (1.655 m) 95% 32.79 kg/m2        Blood Pressure from Last 3 Encounters:   06/13/18 126/70   03/28/18 118/74   11/01/17 134/72    Weight from Last 3 Encounters:   06/13/18 198 lb (89.8 kg)   03/28/18 200 lb (90.7 kg)   11/01/17 191 lb (86.6 kg)              We Performed the Following     BASIC METABOLIC PANEL     Hepatic panel (Albumin, ALT, AST, Bili, Alk Phos, TP)     Hepatitis C Screen Reflex to HCV RNA Quant and Genotype     HIV Antigen Antibody Combo     Lipid Profile     UA reflex to Microscopic and Culture - [place]          Today's Medication Changes          These changes are accurate as of 6/13/18  9:13 AM.  If you have any questions, ask your nurse or doctor.               These medicines have changed or have updated prescriptions.        Dose/Directions    albuterol 108 (90 Base) MCG/ACT Inhaler   Commonly known as:  PROAIR HFA   This may have changed:    - when to take this  - reasons to take this   Used for:  Other emphysema (H)   Changed by:  Elsa Medrano PA        Dose:  2 puff   Inhale 2 puffs into the lungs every 6 hours as needed for shortness of breath / dyspnea or wheezing   Quantity:  18 g   Refills:  11            Where to get your medicines      These medications were sent to [facility] [place] - [place] - [address]  [address] [address]     Phone:  [phone]     albuterol 108 (90 Base) MCG/ACT Inhaler         These medications [place] [facility] [place] [address]  [address] [address]     Phone:  [phone]     escitalopram 20 MG tablet    lisinopril 5 MG tablet                Primary Care Provider Office Phone # Fax #    JUSTIN Bansal [phone]       [facility] [address] [address]        Equal Access to Services     [facility]: Hadii aad ku hadasho Soomaali, waaxda luqadaha, qaybta kaalmada adeegyada, waxay idiin hayaan adeshraddha sanders . So [facility] [phone].    ATENCIÓN: Si habla español, tiene a rock disposición servicios gratuitos de asistencia lingüística. [facility] [phone].    We comply with applicable federal civil rights laws and [place] laws. We do not discriminate on the basis of race, color, national origin, age, disability, sex, sexual orientation, or gender identity.            Thank you!     Thank you for choosing [facility]  for your care. Our goal is always to provide you with excellent care. Hearing back from our patients is one way we can continue to improve our services. Please take a few minutes to complete the written survey that you may receive in the mail after your visit with us. Thank you!             Your Updated Medication List - Protect others around you: Learn how to safely use, store and throw away your medicines at www.disposemymeds.org.          This list is accurate as of 6/13/18  9:13 AM.  Always use your most recent med list.                   Brand Name Dispense Instructions for use Diagnosis    albuterol 108 (90 Base) MCG/ACT Inhaler    PROAIR HFA    18 g    Inhale 2 puffs into the lungs every 6 hours as needed for shortness of breath / dyspnea or wheezing    Other emphysema (H)       benzonatate 200 MG capsule    TESSALON     30 capsule    Take 1 capsule (200 mg) by mouth 3 times daily as needed for cough    Obstructive chronic bronchitis with exacerbation (H)       escitalopram 20 MG tablet    LEXAPRO    90 tablet    Take 1 tablet (20 mg) by mouth daily    AMY (generalized anxiety disorder)       guaiFENesin-codeine 100-10 MG/5ML Soln solution    ROBITUSSIN AC    180 mL    Take 5 mLs by mouth every 4 hours as needed for cough    Obstructive chronic bronchitis with exacerbation (H)       lisinopril 5 MG tablet    PRINIVIL/ZESTRIL    90 tablet    Take 1 tablet (5 mg) by mouth daily    Diastolic dysfunction       loratadine 10 MG capsule     30 capsule    Take 10 mg by mouth daily    Seasonal allergic rhinitis       LORazepam 0.5 MG tablet    ATIVAN    10 tablet    Take 1 tablet (0.5 mg) by mouth every 8 hours as needed for anxiety    AMY (generalized anxiety disorder)       TRELEGY ELLIPTA 100-62.5-25 MCG/INH oral inhaler   Generic drug:  Fluticasone-Umeclidin-Vilanterol      Inhale 1 puff into the lungs daily        VITAMIN E COMPLEX PO

## 2018-06-14 LAB
HCV AB SERPL QL IA: NONREACTIVE
HIV 1+2 AB+HIV1 P24 AG SERPL QL IA: NONREACTIVE

## 2018-06-14 ASSESSMENT — ANXIETY QUESTIONNAIRES: GAD7 TOTAL SCORE: 1

## 2018-06-14 ASSESSMENT — PATIENT HEALTH QUESTIONNAIRE - PHQ9: SUM OF ALL RESPONSES TO PHQ QUESTIONS 1-9: 1

## 2018-06-18 DIAGNOSIS — Z12.11 SPECIAL SCREENING FOR MALIGNANT NEOPLASMS, COLON: ICD-10-CM

## 2018-06-18 PROCEDURE — 82274 ASSAY TEST FOR BLOOD FECAL: CPT | Performed by: PHYSICIAN ASSISTANT

## 2018-06-21 LAB — HEMOCCULT SP1 STL QL: NEGATIVE

## 2018-06-25 ENCOUNTER — RADIANT APPOINTMENT (OUTPATIENT)
Dept: MAMMOGRAPHY | Facility: OTHER | Age: 63
End: 2018-06-25
Attending: PHYSICIAN ASSISTANT
Payer: MEDICARE

## 2018-06-25 DIAGNOSIS — Z12.31 ENCOUNTER FOR SCREENING MAMMOGRAM FOR BREAST CANCER: ICD-10-CM

## 2018-06-25 PROCEDURE — 77063 BREAST TOMOSYNTHESIS BI: CPT | Mod: TC

## 2019-05-22 DIAGNOSIS — F41.1 GAD (GENERALIZED ANXIETY DISORDER): ICD-10-CM

## 2019-05-22 NOTE — TELEPHONE ENCOUNTER
escitalopram (LEXAPRO) 20 MG tablet  Last Written Prescription Date:  6/13/18  Last Fill Quantity: 90,   # refills: 3  Last Office Visit: 6/13/18  Future Office visit:

## 2019-05-23 RX ORDER — ESCITALOPRAM OXALATE 20 MG/1
20 TABLET ORAL DAILY
Qty: 90 TABLET | Refills: 3 | OUTPATIENT
Start: 2019-05-23

## 2020-03-02 ENCOUNTER — HEALTH MAINTENANCE LETTER (OUTPATIENT)
Age: 65
End: 2020-03-02

## 2020-12-20 ENCOUNTER — HEALTH MAINTENANCE LETTER (OUTPATIENT)
Age: 65
End: 2020-12-20

## 2021-04-18 ENCOUNTER — HEALTH MAINTENANCE LETTER (OUTPATIENT)
Age: 66
End: 2021-04-18

## 2021-05-20 NOTE — LETTER
My COPD Action Plan     Name: Latrice Henao    YOB: 1955   Date: 6/8/2018    My doctor: JUSTIN Lira   My clinic: [facility] [place] [address]  [phone]  My Controller Medicine: Vilanterol/fluticasone (Breo Ellipta)   Dose: 100-10mg/5ml     My Rescue Medicine: Albuterol (Proair/Ventolin/Proventil) inhaler   Dose: 108)90 base) mcg/act     My Flare Up Medicine: Azithromycin (Zithromax or Zithromax Z-julia)   Dose: 250mg     My COPD Severity: Severe = FeV1 < 30%-49%      Use of Oxygen: Oxygen Not Prescribed      Make sure you've had your pneumonia   vaccines.          GREEN ZONE       Doing well today      Usual level of activity and exercise    Usual amount of cough and mucus    No shortness of breath    Usual level of health (thinking clearly, sleeping well, feel like eating) Actions:      Take daily medicines    Use oxygen as prescribed    Follow regular exercise and diet plan    Avoid cigarette smoke and other irritants that harm the lungs           YELLOW ZONE          Having a bad day or flare up      Short of breath more than usual    A lot more sputum (mucus) than usual    Sputum looks yellow, green, tan, brown or bloody    More coughing or wheezing    Fever or chills    Less energy; trouble completing activities    Trouble thinking or focusing    Using quick relief inhaler or nebulizer more often    Poor sleep; symptoms wake me up    Do not feel like eating Actions:      Get plenty of rest    Take daily medicines    Use quick relief inhaler every 4 to 6 hours    If you use oxygen, call you doctor to see if you should adjust your oxygen    Do breathing exercises or other things to help you relax    Let a loved one, friend or neighbor know you are feeling worse    Call your care team if you have 2 or more symptoms.  Start taking steroids or antibiotics if directed by your care team           RED ZONE       Need medical care now      Severe  Patient resting on cart. Breathing easy and non labored, no distress noted. VSS. Pt appropriate for discharge. Answered all questiins and provided discharge instructions.    shortness of breath (feel you can't breathe)    Fever, chills    Not enough breath to do any activity    Trouble coughing up mucus, walking or talking    Blood in mucus    Frequent coughing   Rescue medicines are not working    Not able to sleep because of breathing    Feel confused or drowsy    Chest pain    Actions:      Call your health care team.  If you cannot reach your care team, call 911 or go to the emergency room.        Annual Reminders:  Meet with Care Team, Flu Shot every Fall  Pharmacy: [facility] [place] - [address] [facility]

## 2021-10-03 ENCOUNTER — HEALTH MAINTENANCE LETTER (OUTPATIENT)
Age: 66
End: 2021-10-03

## 2022-05-14 ENCOUNTER — HEALTH MAINTENANCE LETTER (OUTPATIENT)
Age: 67
End: 2022-05-14

## 2022-09-04 ENCOUNTER — HEALTH MAINTENANCE LETTER (OUTPATIENT)
Age: 67
End: 2022-09-04

## 2022-10-05 ENCOUNTER — MEDICAL CORRESPONDENCE (OUTPATIENT)
Dept: CT IMAGING | Facility: HOSPITAL | Age: 67
End: 2022-10-05

## 2022-10-10 ENCOUNTER — HOSPITAL ENCOUNTER (OUTPATIENT)
Dept: CT IMAGING | Facility: HOSPITAL | Age: 67
Discharge: HOME | End: 2022-10-10
Attending: INTERNAL MEDICINE | Admitting: INTERNAL MEDICINE
Payer: MEDICARE

## 2022-10-10 DIAGNOSIS — Z87.891 PERSONAL HISTORY OF NICOTINE DEPENDENCE: ICD-10-CM

## 2022-10-10 PROCEDURE — 71271 CT THORAX LUNG CANCER SCR C-: CPT

## 2022-10-17 ENCOUNTER — MEDICAL CORRESPONDENCE (OUTPATIENT)
Dept: CT IMAGING | Facility: HOSPITAL | Age: 67
End: 2022-10-17

## 2022-11-17 ENCOUNTER — MEDICAL CORRESPONDENCE (OUTPATIENT)
Dept: CT IMAGING | Facility: HOSPITAL | Age: 67
End: 2022-11-17

## 2022-11-21 ENCOUNTER — HOSPITAL ENCOUNTER (OUTPATIENT)
Dept: CT IMAGING | Facility: HOSPITAL | Age: 67
Discharge: HOME | End: 2022-11-21
Attending: NURSE PRACTITIONER | Admitting: NURSE PRACTITIONER
Payer: MEDICARE

## 2022-11-21 DIAGNOSIS — I25.10 ATHEROSCLEROTIC HEART DISEASE OF NATIVE CORONARY ARTERY WITHOUT ANGINA PECTORIS: ICD-10-CM

## 2022-11-21 PROCEDURE — 75571 CT HRT W/O DYE W/CA TEST: CPT | Mod: GA

## 2023-01-15 ENCOUNTER — HEALTH MAINTENANCE LETTER (OUTPATIENT)
Age: 68
End: 2023-01-15

## 2023-04-10 ENCOUNTER — HOSPITAL ENCOUNTER (OUTPATIENT)
Dept: CT IMAGING | Facility: HOSPITAL | Age: 68
Discharge: HOME | End: 2023-04-10
Attending: INTERNAL MEDICINE | Admitting: INTERNAL MEDICINE
Payer: COMMERCIAL

## 2023-04-10 DIAGNOSIS — R91.1 SOLITARY PULMONARY NODULE: ICD-10-CM

## 2023-04-10 PROCEDURE — 71250 CT THORAX DX C-: CPT

## 2023-06-03 ENCOUNTER — HEALTH MAINTENANCE LETTER (OUTPATIENT)
Age: 68
End: 2023-06-03

## 2023-09-29 ENCOUNTER — TRANSFERRED RECORDS (OUTPATIENT)
Dept: HEALTH INFORMATION MANAGEMENT | Facility: CLINIC | Age: 68
End: 2023-09-29

## 2023-10-24 ENCOUNTER — TRANSFERRED RECORDS (OUTPATIENT)
Dept: HEALTH INFORMATION MANAGEMENT | Facility: CLINIC | Age: 68
End: 2023-10-24

## 2023-11-03 DIAGNOSIS — G47.33 OSA (OBSTRUCTIVE SLEEP APNEA): ICD-10-CM

## 2023-11-03 DIAGNOSIS — R06.09 DOE (DYSPNEA ON EXERTION): Primary | ICD-10-CM

## 2023-11-22 ENCOUNTER — HOSPITAL ENCOUNTER (OUTPATIENT)
Dept: RESPIRATORY THERAPY | Facility: HOSPITAL | Age: 68
Discharge: HOME | End: 2023-11-22
Attending: STUDENT IN AN ORGANIZED HEALTH CARE EDUCATION/TRAINING PROGRAM | Admitting: INTERNAL MEDICINE
Payer: COMMERCIAL

## 2023-11-22 DIAGNOSIS — R06.09 DOE (DYSPNEA ON EXERTION): Primary | ICD-10-CM

## 2023-11-22 DIAGNOSIS — G47.33 OSA (OBSTRUCTIVE SLEEP APNEA): ICD-10-CM

## 2023-11-22 DIAGNOSIS — J43.8 OTHER EMPHYSEMA (H): ICD-10-CM

## 2023-11-22 LAB — HGB BLD-MCNC: 13.7 G/DL (ref 11.7–15.7)

## 2023-11-22 PROCEDURE — 250N000009 HC RX 250

## 2023-11-22 PROCEDURE — 94726 PLETHYSMOGRAPHY LUNG VOLUMES: CPT

## 2023-11-22 PROCEDURE — 85018 HEMOGLOBIN: CPT

## 2023-11-22 PROCEDURE — 94618 PULMONARY STRESS TESTING: CPT

## 2023-11-22 PROCEDURE — 94060 EVALUATION OF WHEEZING: CPT | Mod: 26 | Performed by: INTERNAL MEDICINE

## 2023-11-22 PROCEDURE — 36415 COLL VENOUS BLD VENIPUNCTURE: CPT

## 2023-11-22 PROCEDURE — 94060 EVALUATION OF WHEEZING: CPT

## 2023-11-22 PROCEDURE — 94726 PLETHYSMOGRAPHY LUNG VOLUMES: CPT | Mod: 26 | Performed by: INTERNAL MEDICINE

## 2023-11-22 PROCEDURE — 94729 DIFFUSING CAPACITY: CPT

## 2023-11-22 PROCEDURE — 94761 N-INVAS EAR/PLS OXIMETRY MLT: CPT

## 2023-11-22 PROCEDURE — 94618 PULMONARY STRESS TESTING: CPT | Mod: 26 | Performed by: INTERNAL MEDICINE

## 2023-11-22 PROCEDURE — 94729 DIFFUSING CAPACITY: CPT | Mod: 26 | Performed by: INTERNAL MEDICINE

## 2023-11-22 RX ORDER — ALBUTEROL SULFATE 0.83 MG/ML
2.5 SOLUTION RESPIRATORY (INHALATION) ONCE
Status: COMPLETED | OUTPATIENT
Start: 2023-11-22 | End: 2023-11-22

## 2023-11-22 RX ADMIN — ALBUTEROL SULFATE 2.5 MG: 2.5 SOLUTION RESPIRATORY (INHALATION) at 08:01

## 2023-12-20 ENCOUNTER — TRANSFERRED RECORDS (OUTPATIENT)
Dept: HEALTH INFORMATION MANAGEMENT | Facility: CLINIC | Age: 68
End: 2023-12-20

## 2024-04-02 ENCOUNTER — TRANSFERRED RECORDS (OUTPATIENT)
Dept: HEALTH INFORMATION MANAGEMENT | Facility: CLINIC | Age: 69
End: 2024-04-02

## 2024-04-09 DIAGNOSIS — J44.9 COPD (CHRONIC OBSTRUCTIVE PULMONARY DISEASE) (H): Primary | ICD-10-CM

## 2024-04-16 ENCOUNTER — HOSPITAL ENCOUNTER (OUTPATIENT)
Dept: RESPIRATORY THERAPY | Facility: HOSPITAL | Age: 69
Discharge: HOME | End: 2024-04-16
Attending: STUDENT IN AN ORGANIZED HEALTH CARE EDUCATION/TRAINING PROGRAM | Admitting: INTERNAL MEDICINE
Payer: COMMERCIAL

## 2024-04-16 DIAGNOSIS — J44.9 COPD (CHRONIC OBSTRUCTIVE PULMONARY DISEASE) (H): ICD-10-CM

## 2024-04-16 PROCEDURE — 94618 PULMONARY STRESS TESTING: CPT

## 2024-04-16 PROCEDURE — 94618 PULMONARY STRESS TESTING: CPT | Mod: 26 | Performed by: INTERNAL MEDICINE

## 2024-04-24 ENCOUNTER — HOSPITAL ENCOUNTER (EMERGENCY)
Facility: HOSPITAL | Age: 69
Discharge: HOME | End: 2024-04-24
Attending: STUDENT IN AN ORGANIZED HEALTH CARE EDUCATION/TRAINING PROGRAM | Admitting: STUDENT IN AN ORGANIZED HEALTH CARE EDUCATION/TRAINING PROGRAM
Payer: COMMERCIAL

## 2024-04-24 ENCOUNTER — APPOINTMENT (OUTPATIENT)
Dept: GENERAL RADIOLOGY | Facility: HOSPITAL | Age: 69
End: 2024-04-24
Attending: STUDENT IN AN ORGANIZED HEALTH CARE EDUCATION/TRAINING PROGRAM
Payer: COMMERCIAL

## 2024-04-24 VITALS
TEMPERATURE: 97.8 F | RESPIRATION RATE: 13 BRPM | OXYGEN SATURATION: 96 % | DIASTOLIC BLOOD PRESSURE: 82 MMHG | HEART RATE: 70 BPM | SYSTOLIC BLOOD PRESSURE: 154 MMHG

## 2024-04-24 DIAGNOSIS — R07.89 LEFT-SIDED CHEST WALL PAIN: ICD-10-CM

## 2024-04-24 LAB
ANION GAP SERPL CALCULATED.3IONS-SCNC: 11 MMOL/L (ref 7–15)
BASOPHILS # BLD AUTO: 0.1 10E3/UL (ref 0–0.2)
BASOPHILS NFR BLD AUTO: 2 %
BUN SERPL-MCNC: 10.4 MG/DL (ref 8–23)
CALCIUM SERPL-MCNC: 9.3 MG/DL (ref 8.8–10.2)
CHLORIDE SERPL-SCNC: 103 MMOL/L (ref 98–107)
CREAT SERPL-MCNC: 0.79 MG/DL (ref 0.51–0.95)
DEPRECATED HCO3 PLAS-SCNC: 24 MMOL/L (ref 22–29)
EGFRCR SERPLBLD CKD-EPI 2021: 80 ML/MIN/1.73M2
EOSINOPHIL # BLD AUTO: 0.2 10E3/UL (ref 0–0.7)
EOSINOPHIL NFR BLD AUTO: 4 %
ERYTHROCYTE [DISTWIDTH] IN BLOOD BY AUTOMATED COUNT: 12.6 % (ref 10–15)
GLUCOSE SERPL-MCNC: 99 MG/DL (ref 70–99)
HCT VFR BLD AUTO: 41.8 % (ref 35–47)
HGB BLD-MCNC: 13.7 G/DL (ref 11.7–15.7)
HOLD SPECIMEN: NORMAL
IMM GRANULOCYTES # BLD: 0 10E3/UL
IMM GRANULOCYTES NFR BLD: 0 %
LYMPHOCYTES # BLD AUTO: 1.2 10E3/UL (ref 0.8–5.3)
LYMPHOCYTES NFR BLD AUTO: 25 %
MCH RBC QN AUTO: 28.7 PG (ref 26.5–33)
MCHC RBC AUTO-ENTMCNC: 32.8 G/DL (ref 31.5–36.5)
MCV RBC AUTO: 87 FL (ref 78–100)
MONOCYTES # BLD AUTO: 0.5 10E3/UL (ref 0–1.3)
MONOCYTES NFR BLD AUTO: 10 %
NEUTROPHILS # BLD AUTO: 2.8 10E3/UL (ref 1.6–8.3)
NEUTROPHILS NFR BLD AUTO: 60 %
NRBC # BLD AUTO: 0 10E3/UL
NRBC BLD AUTO-RTO: 0 /100
PLATELET # BLD AUTO: 248 10E3/UL (ref 150–450)
POTASSIUM SERPL-SCNC: 4.2 MMOL/L (ref 3.4–5.3)
RBC # BLD AUTO: 4.78 10E6/UL (ref 3.8–5.2)
SODIUM SERPL-SCNC: 138 MMOL/L (ref 135–145)
TROPONIN T SERPL HS-MCNC: 8 NG/L
WBC # BLD AUTO: 4.7 10E3/UL (ref 4–11)

## 2024-04-24 PROCEDURE — 85048 AUTOMATED LEUKOCYTE COUNT: CPT | Performed by: STUDENT IN AN ORGANIZED HEALTH CARE EDUCATION/TRAINING PROGRAM

## 2024-04-24 PROCEDURE — 84484 ASSAY OF TROPONIN QUANT: CPT | Performed by: STUDENT IN AN ORGANIZED HEALTH CARE EDUCATION/TRAINING PROGRAM

## 2024-04-24 PROCEDURE — 93005 ELECTROCARDIOGRAM TRACING: CPT

## 2024-04-24 PROCEDURE — 71046 X-RAY EXAM CHEST 2 VIEWS: CPT

## 2024-04-24 PROCEDURE — 93010 ELECTROCARDIOGRAM REPORT: CPT | Performed by: INTERNAL MEDICINE

## 2024-04-24 PROCEDURE — 82374 ASSAY BLOOD CARBON DIOXIDE: CPT | Performed by: STUDENT IN AN ORGANIZED HEALTH CARE EDUCATION/TRAINING PROGRAM

## 2024-04-24 PROCEDURE — 99285 EMERGENCY DEPT VISIT HI MDM: CPT | Mod: 25

## 2024-04-24 PROCEDURE — 36415 COLL VENOUS BLD VENIPUNCTURE: CPT | Performed by: STUDENT IN AN ORGANIZED HEALTH CARE EDUCATION/TRAINING PROGRAM

## 2024-04-24 PROCEDURE — 99284 EMERGENCY DEPT VISIT MOD MDM: CPT | Performed by: STUDENT IN AN ORGANIZED HEALTH CARE EDUCATION/TRAINING PROGRAM

## 2024-04-24 RX ORDER — ATORVASTATIN CALCIUM 40 MG/1
40 TABLET, FILM COATED ORAL AT BEDTIME
COMMUNITY
Start: 2024-03-06

## 2024-04-24 RX ORDER — CETIRIZINE HYDROCHLORIDE, PSEUDOEPHEDRINE HYDROCHLORIDE 5; 120 MG/1; MG/1
1 TABLET, FILM COATED, EXTENDED RELEASE ORAL DAILY
COMMUNITY

## 2024-04-24 RX ORDER — ASPIRIN 81 MG/1
81 TABLET ORAL AT BEDTIME
COMMUNITY

## 2024-04-24 RX ORDER — MONTELUKAST SODIUM 10 MG/1
1 TABLET ORAL AT BEDTIME
COMMUNITY
Start: 2024-02-15

## 2024-04-24 ASSESSMENT — COLUMBIA-SUICIDE SEVERITY RATING SCALE - C-SSRS
1. IN THE PAST MONTH, HAVE YOU WISHED YOU WERE DEAD OR WISHED YOU COULD GO TO SLEEP AND NOT WAKE UP?: NO
6. HAVE YOU EVER DONE ANYTHING, STARTED TO DO ANYTHING, OR PREPARED TO DO ANYTHING TO END YOUR LIFE?: NO
2. HAVE YOU ACTUALLY HAD ANY THOUGHTS OF KILLING YOURSELF IN THE PAST MONTH?: NO

## 2024-04-24 ASSESSMENT — ACTIVITIES OF DAILY LIVING (ADL)
ADLS_ACUITY_SCORE: 33
ADLS_ACUITY_SCORE: 35

## 2024-04-24 NOTE — ED NOTES
Reports pain is not worse with exertion. No other symptoms. Reports has had increased cough the last few days but no other cold symptoms. Hx of COPD and has been taking her inhalers and meds. Does not have nebs at home.

## 2024-04-24 NOTE — DISCHARGE INSTRUCTIONS
- Please take Tylenol and Ibuprofen for your symptoms  - Please return to the Emergency Room if you do not improve, feel worse, or have any new or concerning symptoms. We would especially want to see you back if you experience worsening pain or shortness of breath  - Please follow up with a primary care physician in 4-5 days

## 2024-04-24 NOTE — ED PROVIDER NOTES
History     Chief Complaint   Patient presents with    Chest Pain     HPI  Latrice Henao is a 69 year old female who presents with left sided chest pain. Present since 5 hours ago. Not doing anything when it started. Not worse with ambulation. She has COPD and has had a cold the past several days with a cough, but no increase in sputum and no SOB. No pleuritic type chest pain. No lightheadedness. No abdominal pain. No N/V. No diarrhea. Otherwise well. No hx of ACS/MI. No hx of CAD. Does have HTN/HLD. No hx of DM2. No hx of PE/CHF. No radiation of pain to back.    Allergies:  No Known Allergies    Problem List:    Patient Active Problem List    Diagnosis Date Noted    ACP (advance care planning) 06/14/2017     Priority: Medium     Advance Care Planning 6/14/2017: ACP Review of Chart / Resources Provided:  Reviewed chart for advance care plan.  Latrice Henao has no plan or code status on file. Discussed available resources and provided with information.   Added by Loraine Chua        Advance Care Planning 5/20/2016: ACP Review of Chart / Resources Provided:  Reviewed chart for advance care plan.  Latrice Henao has no plan or code status on file. Discussed available resources and provided with information. Confirmed code status reflects current choices pending further ACP discussions.  Confirmed/documented legally designated decision makers.  Added by Deann Soto            Diastolic dysfunction 03/23/2017     Priority: Medium    Elevated cholesterol 03/20/2017     Priority: Medium    Hypertension 02/24/2014     Priority: Medium    COPD (chronic obstructive pulmonary disease) (H) 06/26/2013     Priority: Medium     Spirometry done 02/07/2017. FEV1 scor 55%  Moderate severe Obstructive Airway Disease-  Panlobular Emphysematous type, reversible component           Past Medical History:    Past Medical History:   Diagnosis Date    Anxiety disorder     COPD (chronic obstructive pulmonary  disease) (H)     Diastolic dysfunction     Hypertension        Past Surgical History:    Past Surgical History:   Procedure Laterality Date    BLEPHAROPLASTY, BROW LIFT BILATERAL, COMBINED Bilateral 2016    Procedure: COMBINED BLEPHAROPLASTY, BROW LIFT BILATERAL;  Surgeon: David Vitale MD;  Location: [facility] OR    ORTHOPEDIC SURGERY  2017    Left knee arthroscopic-[place] [employer]       Family History:    Family History   Problem Relation Age of Onset    Myocardial Infarction Paternal Grandmother         myocardial infarction - cause of death    Cancer Father         lymphoma       Social History:  Marital Status:   [2]  Social History     Tobacco Use    Smoking status: Former     Current packs/day: 0.00     Average packs/day: 0.5 packs/day for 40.0 years (20.0 ttl pk-yrs)     Types: Cigarettes     Start date: 1972     Quit date: 2012     Years since quittin.3    Smokeless tobacco: Never    Tobacco comments:     NextGen: current every day smoker - passive smoke exposure   Substance Use Topics    Alcohol use: No    Drug use: No        Medications:    albuterol (PROAIR HFA) 108 (90 Base) MCG/ACT Inhaler  aspirin 81 MG EC tablet  atorvastatin (LIPITOR) 40 MG tablet  benzonatate (TESSALON) 200 MG capsule  cetirizine-pseudoePHEDrine ER (ZYRTEC-D) 5-120 MG 12 hr tablet  escitalopram (LEXAPRO) 20 MG tablet  Fluticasone-Umeclidin-Vilanterol (TRELEGY ELLIPTA) 100-62.5-25 MCG/INH oral inhaler  lisinopril (PRINIVIL/ZESTRIL) 5 MG tablet  loratadine 10 MG capsule  LORazepam (ATIVAN) 0.5 MG tablet  montelukast (SINGULAIR) 10 MG tablet  VITAMIN E COMPLEX PO  guaiFENesin-codeine (ROBITUSSIN AC) 100-10 MG/5ML SOLN solution          Review of Systems   All other systems reviewed and are negative.      Physical Exam   BP: 160/83  Pulse: 91  Temp: 97.8  F (36.6  C)  Resp: 16  SpO2: 97 %      Physical Exam  Vitals and nursing note reviewed.   Constitutional:       General: She is not in acute distress.      Appearance: She is well-developed. She is not ill-appearing, toxic-appearing or diaphoretic.   HENT:      Head: Normocephalic.   Eyes:      Pupils: Pupils are equal, round, and reactive to light.   Cardiovascular:      Rate and Rhythm: Normal rate and regular rhythm.      Heart sounds: Normal heart sounds.   Pulmonary:      Effort: Pulmonary effort is normal.      Breath sounds: Normal breath sounds.   Chest:      Chest wall: Tenderness present.   Abdominal:      Palpations: Abdomen is soft.      Tenderness: There is no abdominal tenderness.   Musculoskeletal:      Cervical back: Normal range of motion and neck supple.      Right lower leg: No edema.      Left lower leg: No edema.   Skin:     General: Skin is warm and dry.      Capillary Refill: Capillary refill takes less than 2 seconds.   Neurological:      General: No focal deficit present.      Mental Status: She is alert and oriented to person, place, and time.   Psychiatric:         Mood and Affect: Mood normal.         ED Course     ED Course as of 04/24/24 1223   Wed Apr 24, 2024   1215 Work-up reassuring. Her exam is most consistent with Msk type chest pain. Okay for discharge and PCP follow-up in 5 days as already scheduled.   1215 Findings were discussed with the patient including non-emergent imaging/lab results. Additional verbal instructions were discussed with the patient as well. Instructed to follow up with a primary care provider within 5 days. Also discussed specific warning signs and instructed to return to the ED if there are any concerns. Patient voiced understanding of instructions, questions were answered and the patient was discharged home in stable condition.       Procedures              EKG Interpretation:      Interpreted by NKECHI FLYNN MD  Time reviewed: 0930  Symptoms at time of EKG: Chest Pain   Rhythm: normal sinus   Rate: normal  Axis: normal  Ectopy: none  Conduction: normal  ST Segments/ T Waves: Non-specific ST-T wave  changes  Q Waves: none  Comparison to prior: Unchanged    Clinical Impression: normal EKG           Results for orders placed or performed during the hospital encounter of 04/24/24 (from the past 24 hour(s))   EKG 12-lead, tracing only   Result Value Ref Range    Systolic Blood Pressure  mmHg    Diastolic Blood Pressure  mmHg    Ventricular Rate 82 BPM    Atrial Rate 82 BPM    TX Interval 156 ms    QRS Duration 80 ms     ms    QTc 455 ms    P Axis 74 degrees    R AXIS 24 degrees    T Axis 68 degrees    Interpretation ECG       Sinus rhythm  Normal ECG  No previous ECGs available     Chest XR,  PA & LAT    Narrative    PROCEDURE:  XR CHEST 2 VIEWS    HISTORY: chest pain, .    COMPARISON:  4/10/2023    FINDINGS:  The cardiomediastinal contours are stable. The trachea is midline.  There is calcific aortic atherosclerosis.  No focal consolidation, effusion or pneumothorax.  Emphysematous  changes are chronic.  No suspicious osseous lesion or subdiaphragmatic free air.      Impression    IMPRESSION:      Stable chest.      OLY NICOLE MD         SYSTEM ID:  [id number]   CBC with Platelets & Differential    Narrative    The following orders were created for panel order CBC with Platelets & Differential.  Procedure                               Abnormality         Status                     ---------                               -----------         ------                     CBC with platelets and d...[[id number]]                      Final result                 Please view results for these tests on the individual orders.   Basic metabolic panel   Result Value Ref Range    Sodium 138 135 - 145 mmol/L    Potassium 4.2 3.4 - 5.3 mmol/L    Chloride 103 98 - 107 mmol/L    Carbon Dioxide (CO2) 24 22 - 29 mmol/L    Anion Gap 11 7 - 15 mmol/L    Urea Nitrogen 10.4 8.0 - 23.0 mg/dL    Creatinine 0.79 0.51 - 0.95 mg/dL    GFR Estimate 80 >60 mL/min/1.73m2    Calcium 9.3 8.8 - 10.2 mg/dL    Glucose 99 70 - 99 mg/dL    Troponin T, High Sensitivity   Result Value Ref Range    Troponin T, High Sensitivity 8 <=14 ng/L   [facility] Draw    Narrative    The following orders were created for panel order [facility] Draw.  Procedure                               Abnormality         Status                     ---------                               -----------         ------                     Extra Red Top Tube[[id number]]                               Final result               Extra Green Top (Lithium...[[id number]]                      Final result               Extra Purple Top Tube[[id number]]                            Final result                 Please view results for these tests on the individual orders.   CBC with platelets and differential   Result Value Ref Range    WBC Count 4.7 4.0 - 11.0 10e3/uL    RBC Count 4.78 3.80 - 5.20 10e6/uL    Hemoglobin 13.7 11.7 - 15.7 g/dL    Hematocrit 41.8 35.0 - 47.0 %    MCV 87 78 - 100 fL    MCH 28.7 26.5 - 33.0 pg    MCHC 32.8 31.5 - 36.5 g/dL    RDW 12.6 10.0 - 15.0 %    Platelet Count 248 150 - 450 10e3/uL    % Neutrophils 60 %    % Lymphocytes 25 %    % Monocytes 10 %    % Eosinophils 4 %    % Basophils 2 %    % Immature Granulocytes 0 %    NRBCs per 100 WBC 0 <1 /100    Absolute Neutrophils 2.8 1.6 - 8.3 10e3/uL    Absolute Lymphocytes 1.2 0.8 - 5.3 10e3/uL    Absolute Monocytes 0.5 0.0 - 1.3 10e3/uL    Absolute Eosinophils 0.2 0.0 - 0.7 10e3/uL    Absolute Basophils 0.1 0.0 - 0.2 10e3/uL    Absolute Immature Granulocytes 0.0 <=0.4 10e3/uL    Absolute NRBCs 0.0 10e3/uL   Extra Red Top Tube   Result Value Ref Range    Hold Specimen [facility]    Extra Green Top (Lithium Heparin) Tube   Result Value Ref Range    Hold Specimen [facility]    Extra Purple Top Tube   Result Value Ref Range    Hold Specimen [facility]        Medications - No data to display    Assessments & Plan (with Medical Decision Making)     I have reviewed the nursing notes.    Atypical chest pain most consistent with MSK pain. Chest wall is  tender. Not SOB. Not consistent with PE or dissection. Vitals reassuring. No evidence of wheezing on exam and not consistent with COPD exacerbation. Troponin return within normal limits. CXR returned without pneumonia or pneumothorax. EKG reassuring without ischemia. Overall work-up reassuring.    See ED Course.    I have reviewed the findings, diagnosis, plan and need for follow up with the patient.      Discharge Medication List as of 4/24/2024 12:18 PM          Final diagnoses:   Left-sided chest wall pain       4/24/2024   [facility]       Gunnar Vidal MD  04/24/24 [id number]

## 2024-04-24 NOTE — ED TRIAGE NOTES
Patient presents with c/o chest pain that started around 0715. Patient reports its started when she was getting out of bed. Patient reports medial chest pain denies any radiation. Denies any precipitating or alleviating factors. Does have HTN and high cholesterol.

## 2024-04-25 LAB
ATRIAL RATE - MUSE: 82 BPM
DIASTOLIC BLOOD PRESSURE - MUSE: NORMAL MMHG
INTERPRETATION ECG - MUSE: NORMAL
P AXIS - MUSE: 74 DEGREES
PR INTERVAL - MUSE: 156 MS
QRS DURATION - MUSE: 80 MS
QT - MUSE: 390 MS
QTC - MUSE: 455 MS
R AXIS - MUSE: 24 DEGREES
SYSTOLIC BLOOD PRESSURE - MUSE: NORMAL MMHG
T AXIS - MUSE: 68 DEGREES
VENTRICULAR RATE- MUSE: 82 BPM

## 2024-07-07 ENCOUNTER — HEALTH MAINTENANCE LETTER (OUTPATIENT)
Age: 69
End: 2024-07-07

## 2024-08-14 ENCOUNTER — TRANSFERRED RECORDS (OUTPATIENT)
Dept: HEALTH INFORMATION MANAGEMENT | Facility: CLINIC | Age: 69
End: 2024-08-14

## 2024-09-19 ENCOUNTER — TRANSFERRED RECORDS (OUTPATIENT)
Dept: HEALTH INFORMATION MANAGEMENT | Facility: CLINIC | Age: 69
End: 2024-09-19

## 2024-10-07 ENCOUNTER — MEDICAL CORRESPONDENCE (OUTPATIENT)
Dept: CT IMAGING | Facility: HOSPITAL | Age: 69
End: 2024-10-07

## 2024-10-08 ENCOUNTER — MEDICAL CORRESPONDENCE (OUTPATIENT)
Dept: CT IMAGING | Facility: HOSPITAL | Age: 69
End: 2024-10-08

## 2024-10-09 ENCOUNTER — TRANSFERRED RECORDS (OUTPATIENT)
Dept: HEALTH INFORMATION MANAGEMENT | Facility: CLINIC | Age: 69
End: 2024-10-09

## 2024-10-10 ENCOUNTER — HOSPITAL ENCOUNTER (OUTPATIENT)
Dept: CT IMAGING | Facility: HOSPITAL | Age: 69
Discharge: HOME | End: 2024-10-10
Attending: SURGERY | Admitting: SURGERY
Payer: COMMERCIAL

## 2024-10-10 DIAGNOSIS — R91.1 SOLITARY PULMONARY NODULE: ICD-10-CM

## 2024-10-10 PROCEDURE — 71250 CT THORAX DX C-: CPT

## 2024-10-28 ENCOUNTER — MEDICAL CORRESPONDENCE (OUTPATIENT)
Dept: CT IMAGING | Facility: HOSPITAL | Age: 69
End: 2024-10-28

## 2024-11-11 ENCOUNTER — TRANSFERRED RECORDS (OUTPATIENT)
Dept: HEALTH INFORMATION MANAGEMENT | Facility: CLINIC | Age: 69
End: 2024-11-11

## 2024-11-21 ENCOUNTER — TRANSFERRED RECORDS (OUTPATIENT)
Dept: HEALTH INFORMATION MANAGEMENT | Facility: CLINIC | Age: 69
End: 2024-11-21

## 2024-11-27 ENCOUNTER — TRANSFERRED RECORDS (OUTPATIENT)
Dept: HEALTH INFORMATION MANAGEMENT | Facility: CLINIC | Age: 69
End: 2024-11-27

## 2024-12-19 ENCOUNTER — TRANSFERRED RECORDS (OUTPATIENT)
Dept: HEALTH INFORMATION MANAGEMENT | Facility: CLINIC | Age: 69
End: 2024-12-19

## 2025-01-08 ENCOUNTER — TRANSFERRED RECORDS (OUTPATIENT)
Dept: HEALTH INFORMATION MANAGEMENT | Facility: CLINIC | Age: 70
End: 2025-01-08

## 2025-02-09 ENCOUNTER — HEALTH MAINTENANCE LETTER (OUTPATIENT)
Age: 70
End: 2025-02-09

## 2025-02-10 ENCOUNTER — HOSPITAL ENCOUNTER (OUTPATIENT)
Dept: CT IMAGING | Facility: HOSPITAL | Age: 70
Discharge: HOME | End: 2025-02-10
Attending: SURGERY | Admitting: SURGERY
Payer: COMMERCIAL

## 2025-02-10 DIAGNOSIS — R91.1 SOLITARY PULMONARY NODULE: ICD-10-CM

## 2025-02-10 PROCEDURE — 71250 CT THORAX DX C-: CPT

## 2025-02-25 ENCOUNTER — MEDICAL CORRESPONDENCE (OUTPATIENT)
Dept: CT IMAGING | Facility: HOSPITAL | Age: 70
End: 2025-02-25

## 2025-03-13 ENCOUNTER — TRANSFERRED RECORDS (OUTPATIENT)
Dept: HEALTH INFORMATION MANAGEMENT | Facility: CLINIC | Age: 70
End: 2025-03-13

## 2025-05-14 ENCOUNTER — HOSPITAL ENCOUNTER (EMERGENCY)
Facility: HOSPITAL | Age: 70
Discharge: HOME | End: 2025-05-14
Attending: NURSE PRACTITIONER
Payer: COMMERCIAL

## 2025-05-14 ENCOUNTER — APPOINTMENT (OUTPATIENT)
Dept: GENERAL RADIOLOGY | Facility: HOSPITAL | Age: 70
End: 2025-05-14
Attending: NURSE PRACTITIONER
Payer: COMMERCIAL

## 2025-05-14 VITALS
DIASTOLIC BLOOD PRESSURE: 93 MMHG | OXYGEN SATURATION: 93 % | TEMPERATURE: 98.4 F | SYSTOLIC BLOOD PRESSURE: 173 MMHG | BODY MASS INDEX: 31.3 KG/M2 | HEART RATE: 93 BPM | RESPIRATION RATE: 20 BRPM | WEIGHT: 189 LBS

## 2025-05-14 DIAGNOSIS — J44.1 COPD EXACERBATION (H): Primary | ICD-10-CM

## 2025-05-14 LAB
FLUAV RNA SPEC QL NAA+PROBE: NEGATIVE
FLUBV RNA RESP QL NAA+PROBE: NEGATIVE
RSV RNA SPEC NAA+PROBE: NEGATIVE
SARS-COV-2 RNA RESP QL NAA+PROBE: NEGATIVE

## 2025-05-14 PROCEDURE — 99214 OFFICE O/P EST MOD 30 MIN: CPT | Performed by: NURSE PRACTITIONER

## 2025-05-14 PROCEDURE — 71046 X-RAY EXAM CHEST 2 VIEWS: CPT | Mod: 26 | Performed by: RADIOLOGY

## 2025-05-14 PROCEDURE — 71046 X-RAY EXAM CHEST 2 VIEWS: CPT

## 2025-05-14 PROCEDURE — 87637 SARSCOV2&INF A&B&RSV AMP PRB: CPT | Performed by: NURSE PRACTITIONER

## 2025-05-14 PROCEDURE — G0463 HOSPITAL OUTPT CLINIC VISIT: HCPCS | Mod: 25

## 2025-05-14 RX ORDER — PREDNISONE 20 MG/1
TABLET ORAL
Qty: 10 TABLET | Refills: 0 | Status: SHIPPED | OUTPATIENT
Start: 2025-05-14

## 2025-05-14 RX ORDER — AZITHROMYCIN 250 MG/1
TABLET, FILM COATED ORAL
Qty: 6 TABLET | Refills: 0 | Status: SHIPPED | OUTPATIENT
Start: 2025-05-14 | End: 2025-05-19

## 2025-05-14 RX ORDER — FLUTICASONE FUROATE, UMECLIDINIUM BROMIDE AND VILANTEROL TRIFENATATE 200; 62.5; 25 UG/1; UG/1; UG/1
POWDER RESPIRATORY (INHALATION)
COMMUNITY
Start: 2025-05-07

## 2025-05-14 ASSESSMENT — ENCOUNTER SYMPTOMS
FEVER: 0
ABDOMINAL PAIN: 0
VOMITING: 0
NECK STIFFNESS: 0
NAUSEA: 0
PSYCHIATRIC NEGATIVE: 1
EYE DISCHARGE: 0
SORE THROAT: 0
EYE REDNESS: 0
WHEEZING: 1
COUGH: 1
DIARRHEA: 0
HEADACHES: 0
NECK PAIN: 0
CHILLS: 0
SHORTNESS OF BREATH: 1
MYALGIAS: 0

## 2025-05-14 ASSESSMENT — ACTIVITIES OF DAILY LIVING (ADL): ADLS_ACUITY_SCORE: 41

## 2025-05-14 NOTE — DISCHARGE INSTRUCTIONS
Azithromycin as ordered  - Take entire course of antibiotic even if you start to feel better.  - Antibiotics can cause stomach upset including nausea and diarrhea. Read your bottle or ask the pharmacist if antibiotic can be taken with food to help prevent nausea. If you have symptoms of diarrhea you can take an over-the-counter probiotic and/or increase foods with probiotics such as yogurt, Dante, sauerkraut.    Prednisone as ordered    Follow-up with primary care provider or return to urgent care/ED with any worsening in condition or additional concerns.

## 2025-05-14 NOTE — ED PROVIDER NOTES
History     Chief Complaint   Patient presents with    Cough     HPI  Latrice Henao is a 70 year old female who presents to urgent care today ambulatory with complaints of congestion, cough, shortness of breath and wheezing that started 3 days ago.  Denies any fever, chills, nausea, vomiting, diarrhea or chest pain.  No abdominal pain.  Has COPD and has been taking her trelegy ellipta inhaler and albuterol inhaler as ordered.  Patient states she quit smoking approximately 15 years ago.  Staying hydrated.  No rashes.  No other concerns.    Allergies:  No Known Allergies    Problem List:    Patient Active Problem List    Diagnosis Date Noted    ACP (advance care planning) 06/14/2017     Priority: Medium     Advance Care Planning 6/14/2017: ACP Review of Chart / Resources Provided:  Reviewed chart for advance care plan.  Latrice Henao has no plan or code status on file. Discussed available resources and provided with information.   Added by Loraine Chua        Advance Care Planning 5/20/2016: ACP Review of Chart / Resources Provided:  Reviewed chart for advance care plan.  Latrice Henao has no plan or code status on file. Discussed available resources and provided with information. Confirmed code status reflects current choices pending further ACP discussions.  Confirmed/documented legally designated decision makers.  Added by Deann Soto            Diastolic dysfunction 03/23/2017     Priority: Medium    Elevated cholesterol 03/20/2017     Priority: Medium    Hypertension 02/24/2014     Priority: Medium    COPD (chronic obstructive pulmonary disease) (H) 06/26/2013     Priority: Medium     Spirometry done 02/07/2017. FEV1 scor 55%  Moderate severe Obstructive Airway Disease-  Panlobular Emphysematous type, reversible component           Past Medical History:    Past Medical History:   Diagnosis Date    Anxiety disorder     COPD (chronic obstructive pulmonary disease) (H)     Diastolic  dysfunction     Hypertension        Past Surgical History:    Past Surgical History:   Procedure Laterality Date    BLEPHAROPLASTY, BROW LIFT BILATERAL, COMBINED Bilateral 2016    Procedure: COMBINED BLEPHAROPLASTY, BROW LIFT BILATERAL;  Surgeon: David Vitale MD;  Location: [facility]    ORTHOPEDIC SURGERY  2017    Left knee arthroscopic-[place]       Family History:    Family History   Problem Relation Age of Onset    Myocardial Infarction Paternal Grandmother         myocardial infarction - cause of death    Cancer Father         lymphoma       Social History:  Marital Status:   [2]  Social History     Tobacco Use    Smoking status: Former     Current packs/day: 0.00     Average packs/day: 0.5 packs/day for 40.0 years (20.0 ttl pk-yrs)     Types: Cigarettes     Start date: 1972     Quit date: 2012     Years since quittin.3    Smokeless tobacco: Never    Tobacco comments:     [employer]Gen: current every day smoker - passive smoke exposure   Substance Use Topics    Alcohol use: No    Drug use: No        Medications:    albuterol (PROAIR HFA) 108 (90 Base) MCG/ACT Inhaler  aspirin 81 MG EC tablet  atorvastatin (LIPITOR) 40 MG tablet  azithromycin (ZITHROMAX Z-JOEL) 250 MG tablet  cetirizine-pseudoePHEDrine ER (ZYRTEC-D) 5-120 MG 12 hr tablet  escitalopram (LEXAPRO) 20 MG tablet  lisinopril (PRINIVIL/ZESTRIL) 5 MG tablet  loratadine 10 MG capsule  LORazepam (ATIVAN) 0.5 MG tablet  montelukast (SINGULAIR) 10 MG tablet  predniSONE (DELTASONE) 20 MG tablet  TRELEGY ELLIPTA 200-62.5-25 MCG/ACT oral inhaler  VITAMIN E COMPLEX PO  benzonatate (TESSALON) 200 MG capsule  Fluticasone-Umeclidin-Vilanterol (TRELEGY ELLIPTA) 100-62.5-25 MCG/INH oral inhaler  guaiFENesin-codeine (ROBITUSSIN AC) 100-10 MG/5ML SOLN solution      Review of Systems   Constitutional:  Negative for chills and fever.   HENT:  Positive for congestion. Negative for ear pain and sore throat.    Eyes:  Negative for  discharge and redness.   Respiratory:  Positive for cough, shortness of breath and wheezing.    Cardiovascular:  Negative for chest pain.   Gastrointestinal:  Negative for abdominal pain, diarrhea, nausea and vomiting.   Genitourinary:  Negative for decreased urine volume.   Musculoskeletal:  Negative for gait problem, myalgias, neck pain and neck stiffness.   Skin:  Negative for rash.   Neurological:  Negative for headaches.   Psychiatric/Behavioral: Negative.       Physical Exam   BP: (!) 173/93  Pulse: 93  Temp: 98.4  F (36.9  C)  Resp: 20  Weight: 85.7 kg (189 lb)  SpO2: 93 %    Physical Exam  Vitals and nursing note reviewed.   Constitutional:       General: She is not in acute distress.     Appearance: Normal appearance. She is not ill-appearing or toxic-appearing.   HENT:      Right Ear: Tympanic membrane, ear canal and external ear normal.      Left Ear: Tympanic membrane, ear canal and external ear normal.      Nose: Congestion present.      Mouth/Throat:      Mouth: Mucous membranes are moist.      Pharynx: Oropharynx is clear. No oropharyngeal exudate or posterior oropharyngeal erythema.   Cardiovascular:      Rate and Rhythm: Normal rate and regular rhythm.      Pulses: Normal pulses.      Heart sounds: Normal heart sounds.   Pulmonary:      Effort: Pulmonary effort is normal.      Breath sounds: Wheezing present.   Abdominal:      General: Bowel sounds are normal.      Palpations: Abdomen is soft.      Tenderness: There is no abdominal tenderness.   Musculoskeletal:      Cervical back: Normal range of motion and neck supple.   Neurological:      Mental Status: She is alert.   Psychiatric:         Mood and Affect: Mood normal.       ED Course     Procedures    Results for orders placed or performed during the hospital encounter of 05/14/25 (from the past 24 hours)   Influenza A/B, RSV and SARS-CoV2 PCR (COVID-19) Nasopharyngeal    Specimen: Nasopharyngeal; Swab   Result Value Ref Range    Influenza A PCR  Negative Negative    Influenza B PCR Negative Negative    RSV PCR Negative Negative    SARS CoV2 PCR Negative Negative    Narrative    Testing was performed using the Xpert Xpress CoV2/Flu/RSV Assay on the [employer] GeneXpert Instrument. This test should be ordered for the detection of SARS-CoV2, influenza, and RSV viruses in individuals with signs and symptoms of respiratory tract infection. This test is for in vitro diagnostic use under the [place] FDA for laboratories certified under CLIA to perform high or moderate complexity testing. This test has been [place] FDA cleared. A negative result does not rule out the presence of PCR inhibitors in the specimen or target RNA in concentration below the limit of detection for the assay. If only one viral target is positive but coinfection with multiple targets is suspected, the sample should be re-tested with another FDA cleared, approved, or authorized test, if coninfection would change clinical management. This test was validated by the [facility]. These laboratories are certified under the Clinical Laboratory Improvement Amendments of 1988 (CLIA-88) as qualified to perfom high complexity laboratory testing.   Chest XR,  PA & LAT    Narrative    PROCEDURE:  XR CHEST 2 VIEWS    HISTORY: cough    COMPARISON: CT chest 2/10/2025    FINDINGS: PA and lateral chest radiographs    Cardiomediastinal silhouette is within normal limits.  No focal consolidation, effusion or pneumothorax.    No suspicious osseous lesion or subdiaphragmatic free air.      Impression    IMPRESSION:    No acute findings.    IZABELLA COLEMAN MD         SYSTEM ID:  [id number]       Medications - No data to display    Assessments & Plan (with Medical Decision Making)     I have reviewed the nursing notes.    I have reviewed the findings, diagnosis, plan and need for follow up with the patient.  (J44.1) COPD exacerbation (H)  (primary encounter diagnosis)  Plan:   Patient ambulatory with a nontoxic  appearance.  Mild expiratory wheezes throughout, chest xr completed and no acute findings.  Non-smoker, COPD.  No signs of otitis media or strep.  No abdominal pain, vomiting or diarrhea.  Staying hydrated.  No rashes.  COVID, influenza and RSV test negative.  Will start patient on azithromycin and prednisone to cover for COPD exacerbation.  Declines needing refill of albuterol at this time.  Follow-up with primary care provider or return to urgent care/ED with any worsening in condition or additional concerns.  Patient in agreement treatment plan.    Discharge Medication List as of 5/14/2025 10:16 AM        START taking these medications    Details   azithromycin (ZITHROMAX Z-JOEL) 250 MG tablet Two tablets on the first day, then one tablet daily for the next 4 days, Disp-6 tablet, R-0, E-Prescribe      predniSONE (DELTASONE) 20 MG tablet Take two tablets (= 40mg) each day for 5 (five) days, Disp-10 tablet, R-0, E-Prescribe           Final diagnoses:   COPD exacerbation (H)     5/14/2025   [facility]       Alice Harris NP  05/14/25 [id number]

## 2025-05-14 NOTE — ED TRIAGE NOTES
Pt presents with cough and congestion x 3 days. Pt stated she has COPD and on 2lpm O2 at night. Pt denied fever, throat or ear pain, n/v and diarrhea. PT has been taking cough drops only.

## 2025-08-14 ENCOUNTER — HOSPITAL ENCOUNTER (OUTPATIENT)
Dept: CT IMAGING | Facility: HOSPITAL | Age: 70
End: 2025-08-14
Attending: INTERNAL MEDICINE
Payer: COMMERCIAL

## 2025-08-14 DIAGNOSIS — R91.1 SOLITARY PULMONARY NODULE: ICD-10-CM

## 2025-08-14 PROCEDURE — 71250 CT THORAX DX C-: CPT | Mod: 26 | Performed by: RADIOLOGY

## 2025-08-14 PROCEDURE — 71250 CT THORAX DX C-: CPT
